# Patient Record
Sex: FEMALE | Race: WHITE | NOT HISPANIC OR LATINO | URBAN - METROPOLITAN AREA
[De-identification: names, ages, dates, MRNs, and addresses within clinical notes are randomized per-mention and may not be internally consistent; named-entity substitution may affect disease eponyms.]

---

## 2018-07-13 PROCEDURE — 88304 TISSUE EXAM BY PATHOLOGIST: CPT | Performed by: ORTHOPAEDIC SURGERY

## 2018-07-16 ENCOUNTER — LAB REQUISITION (OUTPATIENT)
Dept: LAB | Facility: HOSPITAL | Age: 48
End: 2018-07-16
Attending: ORTHOPAEDIC SURGERY
Payer: COMMERCIAL

## 2018-07-16 DIAGNOSIS — D21.12 BENIGN NEOPLASM OF CONNECTIVE AND OTHER SOFT TISSUE OF LEFT UPPER LIMB, INCLUDING SHOULDER: ICD-10-CM

## 2018-07-17 LAB
CASE RPRT: NORMAL
CLINICAL INFO: NORMAL
PATH REPORT.FINAL DX SPEC: NORMAL
PATH REPORT.GROSS SPEC: NORMAL

## 2021-07-08 ENCOUNTER — VBI (OUTPATIENT)
Dept: ADMINISTRATIVE | Facility: OTHER | Age: 51
End: 2021-07-08

## 2021-07-08 NOTE — TELEPHONE ENCOUNTER
Upon review of the In Basket request we were able to locate, review, and update the patient chart as requested for Immunization(s) Influenza, Mammogram and Pap Smear (HPV) aka Cervical Cancer Screening  Any additional questions or concerns should be emailed to the Practice Liaisons via [email or web address] email, please do not reply via In Basket      Thank you  Edgar Clarke

## 2021-07-31 PROBLEM — Z30.41 SURVEILLANCE FOR BIRTH CONTROL, ORAL CONTRACEPTIVES: Status: ACTIVE | Noted: 2021-07-31

## 2021-07-31 PROBLEM — R92.30 DENSE BREAST TISSUE ON MAMMOGRAM: Status: ACTIVE | Noted: 2021-07-31

## 2021-07-31 PROBLEM — R92.2 DENSE BREAST TISSUE ON MAMMOGRAM: Status: ACTIVE | Noted: 2021-07-31

## 2021-08-03 ENCOUNTER — ANNUAL EXAM (OUTPATIENT)
Dept: OBGYN CLINIC | Facility: CLINIC | Age: 51
End: 2021-08-03
Payer: COMMERCIAL

## 2021-08-03 VITALS
DIASTOLIC BLOOD PRESSURE: 58 MMHG | HEIGHT: 61 IN | BODY MASS INDEX: 21.34 KG/M2 | WEIGHT: 113 LBS | SYSTOLIC BLOOD PRESSURE: 90 MMHG

## 2021-08-03 DIAGNOSIS — Z12.31 ENCOUNTER FOR SCREENING MAMMOGRAM FOR MALIGNANT NEOPLASM OF BREAST: ICD-10-CM

## 2021-08-03 DIAGNOSIS — Z12.4 SCREENING FOR MALIGNANT NEOPLASM OF THE CERVIX: ICD-10-CM

## 2021-08-03 DIAGNOSIS — Z01.419 ENCOUNTER FOR GYNECOLOGICAL EXAMINATION (GENERAL) (ROUTINE) WITHOUT ABNORMAL FINDINGS: Primary | ICD-10-CM

## 2021-08-03 DIAGNOSIS — R92.2 DENSE BREAST TISSUE ON MAMMOGRAM: ICD-10-CM

## 2021-08-03 PROBLEM — Z30.41 SURVEILLANCE FOR BIRTH CONTROL, ORAL CONTRACEPTIVES: Status: RESOLVED | Noted: 2021-07-31 | Resolved: 2021-08-03

## 2021-08-03 PROCEDURE — 99396 PREV VISIT EST AGE 40-64: CPT | Performed by: OBSTETRICS & GYNECOLOGY

## 2021-08-03 RX ORDER — LORATADINE 10 MG/1
10 TABLET ORAL DAILY
COMMUNITY

## 2021-08-03 RX ORDER — ALPRAZOLAM 0.25 MG/1
TABLET ORAL AS NEEDED
COMMUNITY
Start: 2021-07-26

## 2021-08-03 NOTE — PROGRESS NOTES
Assessment/Plan:    Encounter for gynecological examination (general) (routine) without abnormal findings  Father passed 9/2020, no menses from 10/2020  Then restarted 3/2021 and monthly for 5 months, lasts 7 days, mod to heavy flow, no IMB  Off OCPs for one year  Cycle warnings given  Normal breast and pelvic exams, pap done  Mammo and bilateral u/s orders given for dense breasts  Diagnoses and all orders for this visit:    Encounter for gynecological examination (general) (routine) without abnormal findings    Encounter for screening mammogram for malignant neoplasm of breast  -     Mammo screening bilateral w 3d & cad; Future    Screening for malignant neoplasm of the cervix  -     IGP, Aptima HPV, Rfx 16/18,45    Dense breast tissue on mammogram  -     US breast screening bilateral complete (ABUS); Future    Other orders  -     ALPRAZolam (XANAX) 0 25 mg tablet; as needed   -     loratadine (CLARITIN) 10 mg tablet; Take 10 mg by mouth daily  -     Multiple Vitamins-Minerals (DAILY MULTI PO); Take 1 tablet by mouth daily  -     Probiotic Product (PROBIOTIC DAILY PO); Take 1 capsule by mouth daily  -     Calcium Polycarbophil (FIBER-CAPS PO); Take 2 capsules by mouth daily  -     Calcium Carb-Cholecalciferol (CALCIUM 1000 + D PO); Take 1 tablet by mouth 2 (two) times a day  -     Boswellia-Glucosamine-Vit D (OSTEO BI-FLEX ONE PER DAY PO); Take 1 tablet by mouth daily          Subjective:      Patient ID: Leonel Lindsay is a 46 y o  female  Here for well check  The following portions of the patient's history were reviewed and updated as appropriate: allergies, current medications, past family history, past medical history, past social history, past surgical history and problem list     Review of Systems  No breast, bladder, bowel changes   No new persistent pain, bloating, early satiety or pelvic pressure      Objective:      BP 90/58   Ht 5' 0 5" (1 537 m)   Wt 51 3 kg (113 lb)   LMP 07/27/2021 (Exact Date)   Breastfeeding No   BMI 21 71 kg/m²          Physical Exam  General appearance: no distress, pleasant  Neck: thyroid without nodules or thyromegaly, no palpable adenopathy  Lymph nodes: no palpable adenopathy  Breasts: no masses, nodes or skin changes  Abdomen: soft, non tender, no palpable masses  Pelvic exam: normal external genitalia, urethral meatus normal, vagina without lesions, cervix without lesions, uterus small, non tender, no adnexal masses, non tender  Rectal exam: no masses, non tender, RV confirms above

## 2021-08-03 NOTE — LETTER
August 3, 2021     Yassine Rios  [facility]  [address]    Patient: Dayana Moran   YOB: 1970   Date of Visit: 8/3/2021       Dear Dr Eilene Scheuermann: Thank you for referring Robby Ask to me for evaluation  Below are my notes for this consultation  If you have questions, please do not hesitate to call me  I look forward to following your patient along with you  Sincerely,        Cindi Davenport MD        CC: No Recipients  iCndi Davenport MD  8/3/2021  1:26 PM  Sign when Signing Visit  Assessment/Plan:    Encounter for gynecological examination (general) (routine) without abnormal findings  Father passed 9/2020, no menses from 10/2020  Then restarted 3/2021 and monthly for 5 months, lasts 7 days, mod to heavy flow, no IMB  Off OCPs for one year  Cycle warnings given  Normal breast and pelvic exams, pap done  Mammo and bilateral u/s orders given for dense breasts  Diagnoses and all orders for this visit:    Encounter for gynecological examination (general) (routine) without abnormal findings    Encounter for screening mammogram for malignant neoplasm of breast  -     Mammo screening bilateral w 3d & cad; Future    Screening for malignant neoplasm of the cervix  -     IGP, Aptima HPV, Rfx 16/18,45    Dense breast tissue on mammogram  -     US breast screening bilateral complete (ABUS); Future    Other orders  -     ALPRAZolam (XANAX) 0 25 mg tablet; as needed   -     loratadine (CLARITIN) 10 mg tablet; Take 10 mg by mouth daily  -     Multiple Vitamins-Minerals (DAILY MULTI PO); Take 1 tablet by mouth daily  -     Probiotic Product (PROBIOTIC DAILY PO); Take 1 capsule by mouth daily  -     Calcium Polycarbophil (FIBER-CAPS PO); Take 2 capsules by mouth daily  -     Calcium Carb-Cholecalciferol (CALCIUM 1000 + D PO); Take 1 tablet by mouth 2 (two) times a day  -     Boswellia-Glucosamine-Vit D (OSTEO BI-FLEX ONE PER DAY PO);  Take 1 tablet by mouth daily Subjective:      Patient ID: Joey Lombardi is a 46 y o  female  Here for well check  The following portions of the patient's history were reviewed and updated as appropriate: allergies, current medications, past family history, past medical history, past social history, past surgical history and problem list     Review of Systems  No breast, bladder, bowel changes   No new persistent pain, bloating, early satiety or pelvic pressure      Objective:      BP 90/58   Ht 5' 0 5" (1 537 m)   Wt 51 3 kg (113 lb)   LMP 07/27/2021 (Exact Date)   Breastfeeding No   BMI 21 71 kg/m²          Physical Exam  General appearance: no distress, pleasant  Neck: thyroid without nodules or thyromegaly, no palpable adenopathy  Lymph nodes: no palpable adenopathy  Breasts: no masses, nodes or skin changes  Abdomen: soft, non tender, no palpable masses  Pelvic exam: normal external genitalia, urethral meatus normal, vagina without lesions, cervix without lesions, uterus small, non tender, no adnexal masses, non tender  Rectal exam: no masses, non tender, RV confirms above

## 2021-08-03 NOTE — ASSESSMENT & PLAN NOTE
Father passed 9/2020, no menses from 10/2020  Then restarted 3/2021 and monthly for 5 months, lasts 7 days, mod to heavy flow, no IMB  Off OCPs for one year  Cycle warnings given  Normal breast and pelvic exams, pap done  Mammo and bilateral u/s orders given for dense breasts

## 2021-08-03 NOTE — PATIENT INSTRUCTIONS
Return to office in one year unless having any problems such as bleeding, new persistent pain, new progressive bloating, new problems eating (getting full to quickly) or new constant urinary pressure that does not resolve in one week  Continue to strive for total calcium intake of 1500 mg and vitamin D of 1000 IU in combined dietary and supplement forms  Avoid excess calcium as this may adversely effect the arteries in the heart  Call for menses occuring earlier than 21 days, lasting longer than 10 days or for bleeding between cycles

## 2021-08-06 LAB
CYTOLOGIST CVX/VAG CYTO: NORMAL
DX ICD CODE: NORMAL
HPV I/H RISK 4 DNA CVX QL PROBE+SIG AMP: NEGATIVE
OTHER STN SPEC: NORMAL
PATH REPORT.FINAL DX SPEC: NORMAL
SL AMB NOTE:: NORMAL
SL AMB SPECIMEN ADEQUACY: NORMAL
SL AMB TEST METHODOLOGY: NORMAL

## 2022-08-23 ENCOUNTER — ANNUAL EXAM (OUTPATIENT)
Dept: OBGYN CLINIC | Facility: CLINIC | Age: 52
End: 2022-08-23
Payer: COMMERCIAL

## 2022-08-23 VITALS
WEIGHT: 112 LBS | BODY MASS INDEX: 21.14 KG/M2 | HEIGHT: 61 IN | DIASTOLIC BLOOD PRESSURE: 80 MMHG | SYSTOLIC BLOOD PRESSURE: 94 MMHG

## 2022-08-23 DIAGNOSIS — R92.2 DENSE BREAST TISSUE ON MAMMOGRAM: ICD-10-CM

## 2022-08-23 DIAGNOSIS — Z01.419 ENCOUNTER FOR GYNECOLOGICAL EXAMINATION (GENERAL) (ROUTINE) WITHOUT ABNORMAL FINDINGS: Primary | ICD-10-CM

## 2022-08-23 DIAGNOSIS — Z78.0 ASYMPTOMATIC MENOPAUSAL STATE: ICD-10-CM

## 2022-08-23 DIAGNOSIS — Z12.31 ENCOUNTER FOR SCREENING MAMMOGRAM FOR MALIGNANT NEOPLASM OF BREAST: ICD-10-CM

## 2022-08-23 DIAGNOSIS — Z13.820 OSTEOPOROSIS SCREENING: ICD-10-CM

## 2022-08-23 PROCEDURE — 0503F POSTPARTUM CARE VISIT: CPT | Performed by: OBSTETRICS & GYNECOLOGY

## 2022-08-23 PROCEDURE — 99396 PREV VISIT EST AGE 40-64: CPT | Performed by: OBSTETRICS & GYNECOLOGY

## 2022-08-23 RX ORDER — ESCITALOPRAM OXALATE 10 MG/1
TABLET ORAL
COMMUNITY
Start: 2022-08-03

## 2022-08-23 RX ORDER — MAGNESIUM 30 MG
30 TABLET ORAL 2 TIMES DAILY
COMMUNITY

## 2022-08-23 NOTE — ASSESSMENT & PLAN NOTE
All well, no complaints  Normal breast and pelvic exams  LMP 7/2021, tolerable hot flashes and night sweats  Last pap 8/2021 neg/neg; due by 2026  Mammo and bilateral breast u/s orders given; last 8/16/2021   Insurance has declined breast u/s in past, will check  Dexa orders given for BMI 21, menopausal, will check with insurance for coverage prior to 65  Colonoscopy 2018, due 2028

## 2022-08-23 NOTE — LETTER
2022     Jerson Olson DO  [facility]  [address]    Patient: Thiago Nuñez   YOB: 1970   Date of Visit: 2022       Dear Dr Janie Lewis: Thank you for referring Agustina Mccarty to me for evaluation  Below are my notes for this consultation  If you have questions, please do not hesitate to call me  I look forward to following your patient along with you  Sincerely,        Gavin Caraballo MD        CC: No Recipients  Gavin Caraballo MD  2022  3:02 PM  Sign when Signing Visit  Assessment/Plan:    Encounter for gynecological examination (general) (routine) without abnormal findings  All well, no complaints  Normal breast and pelvic exams  LMP 2021, tolerable hot flashes and night sweats  Last pap 2021 neg/neg; due by   Mammo and bilateral breast u/s orders given; last 2021  Insurance has declined breast u/s in past, will check  Dexa orders given for BMI 21, menopausal, will check with insurance for coverage prior to 65  Colonoscopy , due        Diagnoses and all orders for this visit:    Encounter for screening mammogram for malignant neoplasm of breast  -     Mammo screening bilateral w 3d & cad; Future    Dense breast tissue on mammogram  -     US breast screening bilateral complete (ABUS); Future    Osteoporosis screening  -     DXA bone density spine hip and pelvis; Future    Asymptomatic menopausal state  -     DXA bone density spine hip and pelvis; Future    Encounter for gynecological examination (general) (routine) without abnormal findings    Other orders  -     magnesium 30 MG tablet; Take 30 mg by mouth 2 (two) times a day  -     escitalopram (LEXAPRO) 10 mg tablet          Subjective:      Patient ID: Thiago Nuñez is a 45 y o  female  Here for well check        The following portions of the patient's history were reviewed and updated as appropriate:   She  has a past medical history of  2 para 2, History of pelvic ultrasound (02/13/2020), and Papanicolaou smear (04/28/2017)  She   Patient Active Problem List    Diagnosis Date Noted    Encounter for gynecological examination (general) (routine) without abnormal findings 08/03/2021    Dense breast tissue on mammogram 07/31/2021     She  has a past surgical history that includes [place] tooth extraction; Mammo (historical) (Bilateral, 06/17/2020); and Colonoscopy (11/2018)  Her family history is not on file  She  reports that she has never smoked  She has never used smokeless tobacco  She reports that she does not use drugs  No history on file for alcohol use  Current Outpatient Medications   Medication Sig Dispense Refill    ALPRAZolam (XANAX) 0 25 mg tablet as needed       Calcium Carb-Cholecalciferol (CALCIUM 1000 + D PO) Take 1 tablet by mouth 2 (two) times a day      escitalopram (LEXAPRO) 10 mg tablet       loratadine (CLARITIN) 10 mg tablet Take 10 mg by mouth daily      magnesium 30 MG tablet Take 30 mg by mouth 2 (two) times a day      Multiple Vitamins-Minerals (DAILY MULTI PO) Take 1 tablet by mouth daily      Boswellia-Glucosamine-Vit D (OSTEO BI-FLEX ONE PER DAY PO) Take 1 tablet by mouth daily      Calcium Polycarbophil (FIBER-CAPS PO) Take 2 capsules by mouth daily      Probiotic Product (PROBIOTIC DAILY PO) Take 1 capsule by mouth daily       No current facility-administered medications for this visit  She has No Known Allergies       Review of Systems  No PMB, breast, bladder, bowel changes   No new persistent pain, bloating, early satiety or pelvic pressure      Objective:      BP 94/80   Ht 5' 1" (1 549 m)   Wt 50 8 kg (112 lb)   Breastfeeding No   BMI 21 16 kg/m²          Physical Exam    General appearance: no distress, pleasant  Neck: thyroid without nodules or thyromegaly, no palpable adenopathy  Lymph nodes: no palpable adenopathy  Breasts: no masses, nodes or skin changes  Abdomen: soft, non tender, no palpable masses  Pelvic exam: normal external genitalia, urethral meatus normal, vagina without lesions, cervix without lesions, uterus small, non tender, no adnexal masses, non tender  Rectal exam: no masses, non tender, RV confirms above

## 2022-08-23 NOTE — PATIENT INSTRUCTIONS
Return to office in one year unless having any problems such as breast changes, bleeding, new persistent pain, new progressive bloating, new problems eating (getting full to quickly) or new constant urinary pressure that does not resolve in one week  Call in six months to schedule your annual visit       Try Black Cohash Cassidyashwini Rueda) for hot flashes, night sweats

## 2022-09-20 DIAGNOSIS — Z12.31 ENCOUNTER FOR SCREENING MAMMOGRAM FOR MALIGNANT NEOPLASM OF BREAST: ICD-10-CM

## 2022-09-21 DIAGNOSIS — Z13.820 OSTEOPOROSIS SCREENING: ICD-10-CM

## 2022-09-21 DIAGNOSIS — Z78.0 ASYMPTOMATIC MENOPAUSAL STATE: ICD-10-CM

## 2022-09-22 ENCOUNTER — TELEPHONE (OUTPATIENT)
Dept: OBGYN CLINIC | Facility: CLINIC | Age: 52
End: 2022-09-22

## 2022-09-22 NOTE — TELEPHONE ENCOUNTER
Please inform of dexa with normal hip and spine and mild osteopenia in femoral neck with low risk of fracture  (Spine, hip normal  Fem neck T-1 9, low risk of fracture)    Continue to strive for total calcium intake of 1200 mg and vitamin D of 1000 IU in combined dietary and supplement forms  You can only absorb 600 mg of calcium at a time  Avoid excess calcium as this may adversely effect the arteries in the heart  I would recommend repeating the test in 5 year  Please encourage to sign up for MyChart  Thanks!

## 2022-09-22 NOTE — TELEPHONE ENCOUNTER
Reviewed Dexa results and provided recommendation to strive for total calcium intake of 1200 mg and vitamin D of 1000 IU in combined dietary and supplement forms  Avoid excess calcium as this may adversely effect the arteries in the heart,body can only absorb 600mg of calcium at a time

## 2022-12-28 NOTE — PROGRESS NOTES
Assessment/Plan:    Encounter for gynecological examination (general) (routine) without abnormal findings  All well, no complaints  Normal breast and pelvic exams  LMP 2021, tolerable hot flashes and night sweats  Last pap 2021 neg/neg; due by   Mammo and bilateral breast u/s orders given; last 2021  Insurance has declined breast u/s in past, will check  Dexa orders given for BMI 21, menopausal, will check with insurance for coverage prior to 65  Colonoscopy , due        Diagnoses and all orders for this visit:    Encounter for screening mammogram for malignant neoplasm of breast  -     Mammo screening bilateral w 3d & cad; Future    Dense breast tissue on mammogram  -     US breast screening bilateral complete (ABUS); Future    Osteoporosis screening  -     DXA bone density spine hip and pelvis; Future    Asymptomatic menopausal state  -     DXA bone density spine hip and pelvis; Future    Encounter for gynecological examination (general) (routine) without abnormal findings    Other orders  -     magnesium 30 MG tablet; Take 30 mg by mouth 2 (two) times a day  -     escitalopram (LEXAPRO) 10 mg tablet          Subjective:      Patient ID: Cassidy Bach is a 45 y o  female  Here for well check  The following portions of the patient's history were reviewed and updated as appropriate:   She  has a past medical history of  2 para 2, History of pelvic ultrasound (2020), and Papanicolaou smear (2017)  She   Patient Active Problem List    Diagnosis Date Noted    Encounter for gynecological examination (general) (routine) without abnormal findings 2021    Dense breast tissue on mammogram 2021     She  has a past surgical history that includes [place] tooth extraction; Mammo (historical) (Bilateral, 2020); and Colonoscopy (2018)  Her family history is not on file  She  reports that she has never smoked   She has never used smokeless tobacco  She Detail Level: Detailed reports that she does not use drugs  No history on file for alcohol use  Current Outpatient Medications   Medication Sig Dispense Refill    ALPRAZolam (XANAX) 0 25 mg tablet as needed       Calcium Carb-Cholecalciferol (CALCIUM 1000 + D PO) Take 1 tablet by mouth 2 (two) times a day      escitalopram (LEXAPRO) 10 mg tablet       loratadine (CLARITIN) 10 mg tablet Take 10 mg by mouth daily      magnesium 30 MG tablet Take 30 mg by mouth 2 (two) times a day      Multiple Vitamins-Minerals (DAILY MULTI PO) Take 1 tablet by mouth daily      Boswellia-Glucosamine-Vit D (OSTEO BI-FLEX ONE PER DAY PO) Take 1 tablet by mouth daily      Calcium Polycarbophil (FIBER-CAPS PO) Take 2 capsules by mouth daily      Probiotic Product (PROBIOTIC DAILY PO) Take 1 capsule by mouth daily       No current facility-administered medications for this visit  She has No Known Allergies       Review of Systems  No PMB, breast, bladder, bowel changes   No new persistent pain, bloating, early satiety or pelvic pressure      Objective:      BP 94/80   Ht 5' 1" (1 549 m)   Wt 50 8 kg (112 lb)   Breastfeeding No   BMI 21 16 kg/m²          Physical Exam    General appearance: no distress, pleasant  Neck: thyroid without nodules or thyromegaly, no palpable adenopathy  Lymph nodes: no palpable adenopathy  Breasts: no masses, nodes or skin changes  Abdomen: soft, non tender, no palpable masses  Pelvic exam: normal external genitalia, urethral meatus normal, vagina without lesions, cervix without lesions, uterus small, non tender, no adnexal masses, non tender  Rectal exam: no masses, non tender, RV confirms above

## 2023-08-30 ENCOUNTER — APPOINTMENT (OUTPATIENT)
Dept: PREADMISSION TESTING | Facility: HOSPITAL | Age: 53
End: 2023-08-30
Payer: COMMERCIAL

## 2023-08-30 VITALS — BODY MASS INDEX: 20.61 KG/M2 | WEIGHT: 112 LBS | HEIGHT: 62 IN

## 2023-08-30 RX ORDER — ESCITALOPRAM OXALATE 10 MG/1
15 TABLET ORAL DAILY
COMMUNITY

## 2023-08-30 ASSESSMENT — PAIN SCALES - GENERAL: PAINLEVEL: 5

## 2023-08-30 NOTE — PRE-PROCEDURE INSTRUCTIONS
1. Admissions will call you with your arrival time on  23 (day prior to surgery) between 2pm-4pm.  For questions about your arrival time, please call [phone].     2. On the day of your procedure please report to the [facility] in the [place]. Please arrive through the [facility] Entrance([address], [place])  If you are parking in the [facility]age, come to the ground floor of the garage and follow signs to the [facility].  Please report to the Surgery Registration Desk located in the [facility]. Bring insurance card and photo ID     3. Please follow the following fasting guidelines: No solid food EIGHT HOURS prior to arrival time. Unlimited clear liquids, meaning water or PLAIN black coffee WITHOUT any milk, cream, sugar, or sweetener are permitted up to TWO HOURS prior to arrival at the hospital.    4. Please take ONLY the following medications with a sip of water on the morning of your procedure: lexapro    Please stop takin. Other Instructions: You may brush your teeth the morning of the procedure. Rinse and spit, do not swallow.  Bring a list of your medications with dosages.  Use surgical wash as directed.     6. If you develop a cold, cough, fever, rash, or other symptom prior to the day of the procedure, please report it to your physician immediately.    7. If you need to cancel the procedure for any reason, please contact your surgeon.    8. Make arrangements to have safe transportation home accompanied by a responsible adult. If you have not arranged safe transportation home, your surgery will be cancelled. Safe transportation may include private vehicle, ride-share service, taxi and public transportation when accompanied by a responsible adult who will assist you home. A responsible adult is someone known to you and does not include the taxi, ride-share or public transit drive transporting you.    9.  If it is medically necessary for  you to have a longer stay, you will be informed as soon as the decision is made.    10. Only bring essential items to the hospital.  Do not wear or bring anything of value to the hospital including jewelry of any kind, money, or wallet. Do not wear make-up or contact lenses.  DO NOT BRING MEDICATIONS FROM HOME unless instructed to do so. DO bring your hearing aids, glasses, and a case    11. No lotion, creams, powders, or oils on skin the morning of procedure     12. Dress in comfortable clothes.    13.  If instructed, please bring a copy of your Advanced Directive (Living Will/Durable Power of ) on the day of your procedure.     14. Ensuring your safety at all times is a very important part of our [facility] Culture of Safety. After having surgery and sedation, you are at risk for falling and balance issues. Although you may feel awake, the effects of the medication can last up to 24 hours after anesthesia. If you need to use the bathroom during your recovery period, nursing staff will escort you there and stay with you to ensure your safety.    15. Refrain from drinking alcohol and smoking cigarettes for 24 hours prior to surgery.    16. Shower with antibacterial soap (Dial) the night before and morning of your procedure.  If your procedure indicates the need for CHG antiseptic wash (Bactoshield or Hibiclens), please use this instead and follow instructions as discussed at the time of your Pre-Admission Testing phone interview or visit.    Above instructions reviewed with patient and patient acknowledges understanding.      How to Use an Incentive Spirometer  An incentive spirometer is a tool that measures how well you are filling your lungs with each breath. Learning to take long, deep breaths using this tool can help you keep your lungs clear and active. This may help to reverse or lessen your chance of developing breathing (pulmonary) problems, especially infection. You may be asked to use a  spirometer:   After a surgery.   If you have a lung problem or a history of smoking.   After a long period of time when you have been unable to move or be active.  If the spirometer includes an indicator to show the highest number that you have reached, your health care provider or respiratory therapist will help youset a goal. Keep a log of your progress as told by your health care provider.  What are the risks?   Breathing too quickly may cause dizziness or cause you to pass out. Take your time so you do not get dizzy or light-headed.   If you are in pain, you may need to take pain medicine before doing incentive spirometry. It is harder to take a deep breath if you are having pain.  How to use your incentive spirometer    1. Sit up on the edge of your bed or on a chair.  2. Hold the incentive spirometer so that it is in an upright position.  3. Before you use the spirometer, breathe out normally.  4. Place the mouthpiece in your mouth. Make sure your lips are closed tightly around it.  5. Breathe in slowly and as deeply as you can through your mouth, causing the piston or the ball to rise toward the top of the chamber.  6. Hold your breath for 3-5 seconds, or for as long as possible.  ? If the spirometer includes a  indicator, use this to guide you in breathing. Slow down your breathing if the indicator goes above the marked areas.  7. Remove the mouthpiece from your mouth and breathe out normally. The piston or ball will return to the bottom of the chamber.  8. Rest for a few seconds, then repeat the steps 10 or more times.  ? Take your time and take a few normal breaths between deep breaths so that you do not get dizzy or light-headed.  ? Do this every 1-2 hours when you are awake.  9. If the spirometer includes a goal marker to show the highest number you have reached (best effort), use this as a goal to work toward during each repetition.  10. After each set of 10 deep breaths, cough a few times.  This will help to make sure that your lungs are clear.  ? If you have an incision on your chest or abdomen from surgery, place a pillow or a rolled-up towel firmly against the incision when you cough. This can help to reduce pain while taking deep breaths and coughing.  General tips  1. When you are able to get out of bed:  ? Walk around often.  ? Continue to take deep breaths and cough in order to clear your lungs.  2. Keep using the incentive spirometer until your health care provider says it is okay to stop using it. If you have been in the hospital, you may be told to keep using the spirometer at home.  Contact a health care provider if:   You are having difficulty using the spirometer.   You have trouble using the spirometer as often as instructed.   Your pain medicine is not giving enough relief for you to use the spirometer as told.   You have a fever.  Get help right away if:   You develop shortness of breath.   You develop a cough with bloody mucus from the lungs.   You have fluid or blood coming from an incision site after you cough.  Summary   An incentive spirometer is a tool that can help you learn to take long, deep breaths to keep your lungs clear and active.   You may be asked to use a spirometer after a surgery, if you have a lung problem or a history of smoking, or if you have been inactive for a long period of time.   Use your incentive spirometer as instructed every 1-2 hours while you are awake.   If you have an incision on your chest or abdomen, place a pillow or a rolled-up towel firmly against your incision when you cough. This will help to reduce pain.   Get help right away if you have shortness of breath, you cough up bloody mucus, or blood comes from your incision when you cough.  This information is not intended to replace advice given to you by your health care provider. Make sure you discuss any questions you have with your healthcare provider.  Document Revised: 03/08/2021  Document Reviewed: 03/08/2021  [employer] Patient Education © 2022 [employer]        Above instructions reviewed with patient and patient acknowledges understanding.    Form explained by: Teressa berumen rn    covid visitor policy reviewed

## 2023-09-12 ENCOUNTER — APPOINTMENT (OUTPATIENT)
Dept: RADIOLOGY | Facility: HOSPITAL | Age: 53
Setting detail: HOSPITAL OUTPATIENT SURGERY
End: 2023-09-12
Payer: COMMERCIAL

## 2023-09-12 ENCOUNTER — HOSPITAL ENCOUNTER (OUTPATIENT)
Facility: HOSPITAL | Age: 53
Setting detail: HOSPITAL OUTPATIENT SURGERY
Discharge: HOME | End: 2023-09-12
Attending: PODIATRIST | Admitting: PODIATRIST
Payer: COMMERCIAL

## 2023-09-12 ENCOUNTER — ANESTHESIA EVENT (OUTPATIENT)
Dept: OPERATING ROOM | Facility: HOSPITAL | Age: 53
Setting detail: HOSPITAL OUTPATIENT SURGERY
End: 2023-09-12
Payer: COMMERCIAL

## 2023-09-12 VITALS
OXYGEN SATURATION: 100 % | SYSTOLIC BLOOD PRESSURE: 127 MMHG | HEART RATE: 57 BPM | RESPIRATION RATE: 20 BRPM | HEIGHT: 62 IN | DIASTOLIC BLOOD PRESSURE: 73 MMHG | BODY MASS INDEX: 20.61 KG/M2 | WEIGHT: 112 LBS | TEMPERATURE: 97 F

## 2023-09-12 PROCEDURE — 71000002 HC PACU PHASE 2 INITIAL 30MIN: Performed by: PODIATRIST

## 2023-09-12 PROCEDURE — 73630 X-RAY EXAM OF FOOT: CPT | Mod: LT

## 2023-09-12 PROCEDURE — 71000011 HC PACU PHASE 1 EA ADDL MIN: Performed by: PODIATRIST

## 2023-09-12 PROCEDURE — 63600000 HC DRUGS/DETAIL CODE: Mod: JZ

## 2023-09-12 PROCEDURE — 25000000 HC PHARMACY GENERAL: Performed by: PODIATRIST

## 2023-09-12 PROCEDURE — 71000001 HC PACU PHASE 1 INITIAL 30MIN: Performed by: PODIATRIST

## 2023-09-12 PROCEDURE — C1713 ANCHOR/SCREW BN/BN,TIS/BN: HCPCS | Performed by: PODIATRIST

## 2023-09-12 PROCEDURE — C1776 JOINT DEVICE (IMPLANTABLE): HCPCS | Performed by: PODIATRIST

## 2023-09-12 PROCEDURE — 63600000 HC DRUGS/DETAIL CODE: Performed by: STUDENT IN AN ORGANIZED HEALTH CARE EDUCATION/TRAINING PROGRAM

## 2023-09-12 PROCEDURE — 37000001 HC ANESTHESIA GENERAL: Performed by: PODIATRIST

## 2023-09-12 PROCEDURE — 27200000 HC STERILE SUPPLY: Performed by: PODIATRIST

## 2023-09-12 PROCEDURE — 0QBR0ZZ EXCISION OF LEFT TOE PHALANX, OPEN APPROACH: ICD-10-PCS | Performed by: PODIATRIST

## 2023-09-12 PROCEDURE — 36000003 HC OR LEVEL 3 INITIAL 30MIN: Performed by: PODIATRIST

## 2023-09-12 PROCEDURE — 25000000 HC PHARMACY GENERAL: Performed by: STUDENT IN AN ORGANIZED HEALTH CARE EDUCATION/TRAINING PROGRAM

## 2023-09-12 PROCEDURE — 0SRN0JZ REPLACEMENT OF LEFT METATARSAL-PHALANGEAL JOINT WITH SYNTHETIC SUBSTITUTE, OPEN APPROACH: ICD-10-PCS | Performed by: PODIATRIST

## 2023-09-12 PROCEDURE — 25800000 HC PHARMACY IV SOLUTIONS: Performed by: NURSE ANESTHETIST, CERTIFIED REGISTERED

## 2023-09-12 PROCEDURE — 63600000 HC DRUGS/DETAIL CODE: Mod: JZ | Performed by: NURSE ANESTHETIST, CERTIFIED REGISTERED

## 2023-09-12 PROCEDURE — 71000012 HC PACU PHASE 2 EA ADDL MIN: Performed by: PODIATRIST

## 2023-09-12 PROCEDURE — 63600000 HC DRUGS/DETAIL CODE: Performed by: NURSE ANESTHETIST, CERTIFIED REGISTERED

## 2023-09-12 PROCEDURE — 63600000 HC DRUGS/DETAIL CODE: Performed by: PODIATRIST

## 2023-09-12 PROCEDURE — 36000013 HC OR LEVEL 3 EA ADDL MIN: Performed by: PODIATRIST

## 2023-09-12 DEVICE — IMPLANTABLE DEVICE: Type: IMPLANTABLE DEVICE | Site: FIRST TOE | Status: FUNCTIONAL

## 2023-09-12 RX ORDER — ONDANSETRON HYDROCHLORIDE 2 MG/ML
INJECTION, SOLUTION INTRAVENOUS AS NEEDED
Status: DISCONTINUED | OUTPATIENT
Start: 2023-09-12 | End: 2023-09-12 | Stop reason: SURG

## 2023-09-12 RX ORDER — ONDANSETRON HYDROCHLORIDE 2 MG/ML
4 INJECTION, SOLUTION INTRAVENOUS
Status: DISCONTINUED | OUTPATIENT
Start: 2023-09-12 | End: 2023-09-12 | Stop reason: HOSPADM

## 2023-09-12 RX ORDER — LIDOCAINE HYDROCHLORIDE 10 MG/ML
INJECTION, SOLUTION INFILTRATION; PERINEURAL
Status: DISCONTINUED | OUTPATIENT
Start: 2023-09-12 | End: 2023-09-12 | Stop reason: HOSPADM

## 2023-09-12 RX ORDER — DEXAMETHASONE SODIUM PHOSPHATE 4 MG/ML
INJECTION, SOLUTION INTRA-ARTICULAR; INTRALESIONAL; INTRAMUSCULAR; INTRAVENOUS; SOFT TISSUE AS NEEDED
Status: DISCONTINUED | OUTPATIENT
Start: 2023-09-12 | End: 2023-09-12 | Stop reason: SURG

## 2023-09-12 RX ORDER — FENTANYL CITRATE 50 UG/ML
50 INJECTION, SOLUTION INTRAMUSCULAR; INTRAVENOUS EVERY 5 MIN PRN
Status: DISCONTINUED | OUTPATIENT
Start: 2023-09-12 | End: 2023-09-12 | Stop reason: HOSPADM

## 2023-09-12 RX ORDER — BETAMETHASONE SODIUM PHOSPHATE AND BETAMETHASONE ACETATE 3; 3 MG/ML; MG/ML
INJECTION, SUSPENSION INTRA-ARTICULAR; INTRALESIONAL; INTRAMUSCULAR; SOFT TISSUE
Status: DISCONTINUED | OUTPATIENT
Start: 2023-09-12 | End: 2023-09-12 | Stop reason: HOSPADM

## 2023-09-12 RX ORDER — IBUPROFEN 200 MG
16-32 TABLET ORAL AS NEEDED
Status: DISCONTINUED | OUTPATIENT
Start: 2023-09-12 | End: 2023-09-12 | Stop reason: HOSPADM

## 2023-09-12 RX ORDER — LABETALOL HCL 20 MG/4 ML
5 SYRINGE (ML) INTRAVENOUS AS NEEDED
Status: DISCONTINUED | OUTPATIENT
Start: 2023-09-12 | End: 2023-09-12 | Stop reason: HOSPADM

## 2023-09-12 RX ORDER — HYDROMORPHONE HYDROCHLORIDE 1 MG/ML
0.5 INJECTION, SOLUTION INTRAMUSCULAR; INTRAVENOUS; SUBCUTANEOUS
Status: DISCONTINUED | OUTPATIENT
Start: 2023-09-12 | End: 2023-09-12 | Stop reason: HOSPADM

## 2023-09-12 RX ORDER — SODIUM CHLORIDE 9 MG/ML
INJECTION, SOLUTION INTRAVENOUS CONTINUOUS PRN
Status: DISCONTINUED | OUTPATIENT
Start: 2023-09-12 | End: 2023-09-12 | Stop reason: SURG

## 2023-09-12 RX ORDER — OXYCODONE AND ACETAMINOPHEN 5; 325 MG/1; MG/1
1 TABLET ORAL EVERY 6 HOURS PRN
Qty: 20 TABLET | Refills: 0 | Status: SHIPPED | OUTPATIENT
Start: 2023-09-12 | End: 2023-09-17

## 2023-09-12 RX ORDER — BUPIVACAINE HYDROCHLORIDE 5 MG/ML
INJECTION, SOLUTION EPIDURAL; INTRACAUDAL
Status: DISCONTINUED | OUTPATIENT
Start: 2023-09-12 | End: 2023-09-12 | Stop reason: HOSPADM

## 2023-09-12 RX ORDER — PROPOFOL 10 MG/ML
INJECTION, EMULSION INTRAVENOUS CONTINUOUS PRN
Status: DISCONTINUED | OUTPATIENT
Start: 2023-09-12 | End: 2023-09-12 | Stop reason: SURG

## 2023-09-12 RX ORDER — DEXTROSE 40 %
15-30 GEL (GRAM) ORAL AS NEEDED
Status: DISCONTINUED | OUTPATIENT
Start: 2023-09-12 | End: 2023-09-12 | Stop reason: HOSPADM

## 2023-09-12 RX ORDER — EPHEDRINE SULFATE 50 MG/ML
INJECTION, SOLUTION INTRAVENOUS AS NEEDED
Status: DISCONTINUED | OUTPATIENT
Start: 2023-09-12 | End: 2023-09-12 | Stop reason: SURG

## 2023-09-12 RX ORDER — KETOROLAC TROMETHAMINE 15 MG/ML
INJECTION, SOLUTION INTRAMUSCULAR; INTRAVENOUS AS NEEDED
Status: DISCONTINUED | OUTPATIENT
Start: 2023-09-12 | End: 2023-09-12 | Stop reason: SURG

## 2023-09-12 RX ORDER — MIDAZOLAM HYDROCHLORIDE 2 MG/2ML
INJECTION, SOLUTION INTRAMUSCULAR; INTRAVENOUS AS NEEDED
Status: DISCONTINUED | OUTPATIENT
Start: 2023-09-12 | End: 2023-09-12 | Stop reason: SURG

## 2023-09-12 RX ORDER — DEXTROSE 50 % IN WATER (D50W) INTRAVENOUS SYRINGE
25 AS NEEDED
Status: DISCONTINUED | OUTPATIENT
Start: 2023-09-12 | End: 2023-09-12 | Stop reason: HOSPADM

## 2023-09-12 RX ORDER — CEPHALEXIN 500 MG/1
500 CAPSULE ORAL 3 TIMES DAILY
Qty: 15 CAPSULE | Refills: 0 | Status: SHIPPED | OUTPATIENT
Start: 2023-09-12 | End: 2023-09-17

## 2023-09-12 RX ORDER — FENTANYL CITRATE 50 UG/ML
INJECTION, SOLUTION INTRAMUSCULAR; INTRAVENOUS AS NEEDED
Status: DISCONTINUED | OUTPATIENT
Start: 2023-09-12 | End: 2023-09-12 | Stop reason: SURG

## 2023-09-12 RX ADMIN — KETOROLAC TROMETHAMINE 15 MG: 15 INJECTION, SOLUTION INTRAMUSCULAR; INTRAVENOUS at 16:45

## 2023-09-12 RX ADMIN — EPHEDRINE SULFATE 5 MG: 50 INJECTION, SOLUTION INTRAVENOUS at 16:13

## 2023-09-12 RX ADMIN — SODIUM CHLORIDE: 0.9 INJECTION, SOLUTION INTRAVENOUS at 14:58

## 2023-09-12 RX ADMIN — FENTANYL CITRATE 50 MCG: 50 INJECTION INTRAMUSCULAR; INTRAVENOUS at 15:05

## 2023-09-12 RX ADMIN — EPHEDRINE SULFATE 5 MG: 50 INJECTION, SOLUTION INTRAVENOUS at 15:32

## 2023-09-12 RX ADMIN — CEFAZOLIN 2 G: 2 INJECTION, POWDER, FOR SOLUTION INTRAMUSCULAR; INTRAVENOUS at 15:01

## 2023-09-12 RX ADMIN — ONDANSETRON 4 MG: 2 INJECTION INTRAMUSCULAR; INTRAVENOUS at 15:09

## 2023-09-12 RX ADMIN — DEXAMETHASONE SODIUM PHOSPHATE 4 MG: 4 INJECTION, SOLUTION INTRA-ARTICULAR; INTRALESIONAL; INTRAMUSCULAR; INTRAVENOUS; SOFT TISSUE at 15:10

## 2023-09-12 RX ADMIN — EPHEDRINE SULFATE 5 MG: 50 INJECTION, SOLUTION INTRAVENOUS at 16:01

## 2023-09-12 RX ADMIN — MIDAZOLAM HYDROCHLORIDE 2 MG: 1 INJECTION, SOLUTION INTRAMUSCULAR; INTRAVENOUS at 14:58

## 2023-09-12 RX ADMIN — EPHEDRINE SULFATE 5 MG: 50 INJECTION, SOLUTION INTRAVENOUS at 16:26

## 2023-09-12 RX ADMIN — PROPOFOL 100 MCG/KG/MIN: 10 INJECTION, EMULSION INTRAVENOUS at 15:06

## 2023-09-12 NOTE — PERIOPERATIVE NURSING NOTE
VSS, maintains POX on RA. Dressing to L foot CDI. Denies pain. Tolerating crackers and clears. Given d/c instructions- pt and spouse verbalizes understanding, all questions answered. Ambulating with walker without difficulty. Pt maintaining non weight bearing status to LLE. Ambulated to BR, able to void. D/C criteria met, IV removed. Transported to vehicle via wheelchair with PCT. Pt's spouse to transport home.

## 2023-09-12 NOTE — DISCHARGE SUMMARY
-Patient presented to hospital for outpatient podiatric procedure. All pre operative testing and clearance was obtained. All risks and benefits of procedure explained to patient and consent signed.  -After successful podiatric procedure the patient was discharge to home per PACU protocol with discharge instructions and post op pain medication.  -Patient is to follow up in 7-14 days      Sathish Noriega DPM  Pager [phone]

## 2023-09-12 NOTE — ANESTHESIA PREPROCEDURE EVALUATION
Relevant Problems   No relevant active problems       Anesthesia ROS/MED HX    Anesthesia History    Previous anesthetics       Past Surgical History:   Procedure Laterality Date    COLONOSCOPY      EYE SURGERY      LASIK      WISDOM TOOTH EXTRACTION         Physical Exam    Airway   Mallampati: II   TM distance: >3 FB   Neck ROM: full  Cardiovascular - normal   Rhythm: regular   Rate: normalPulmonary - normal   clear to auscultation  Dental - normal        Anesthesia Plan    Plan: general    Technique: total IV anesthesia     Lines and Monitors: PIV     Airway: natural airway / supplemental oxygen     instructed to abstain from smoking on day of procedure    Patient did not smoke on day of surgery   1 ASA  Blood Products:   Use of Blood Products Discussed: No   Anesthetic plan and risks discussed with: patient  Induction:    intravenous   Postop Plan:   Patient Disposition: phase II then home   Pain Management: IV analgesics

## 2023-09-12 NOTE — ANESTHESIOLOGIST PRE-PROCEDURE ATTESTATION
Pre-Procedure Patient Identification:  I am the Primary Anesthesiologist and have identified the patient on 09/12/23 at 11:18 AM.   I have confirmed the procedure(s) will be performed by the following surgeon/proceduralist Pepe Dow DPM.

## 2023-09-12 NOTE — DISCHARGE INSTRUCTIONS
Post-Operative Discharge Instructions   [facility]   Activity:   o No strenuous exercise or heavy lifting (over 10 pounds) until your follow up with doctor   o No driving until following up with doctor   o You may bear weight to your left heel for pivots and transfers only. Wear a surgical shoe on the left foot. You should keep weight off of your foot as much as possible to help with healing and avoid pressure to the area. Use a walker, crutches, or wheelchair for assistance.   o Keep your affected foot elevated on two pillows while in bed and sitting in a chair to decrease swelling   Nutrition:   o Eating more protein will help with wound healing, If possible, add protein to your diet to help increase healing factors.   Wound Care Instructions:   o No soaking your foot until it is completely healed.   o It is important that you keep your dressing clean, dry, and intact. Should your dressing become wet you must call your physician to make an immediate appointment to have the dressing changed. If you are taking a shower, you must use a commercial cast protector. We recommend, certainly, for the initial two week period that you take a sponge bath rather than a shower with its risk of a fall.   Medications:   o Resume all home medications unless otherwise directed by doctor or nurse practitioner   -Percocet was prescribed for your postop pain: Please take 1 to 2 tablets every 4-6 hours as needed for pain  -Keflex was prescribed for postoperative antibiotic: Please take 1 tablet 3 times a day for 5 days  Reasons to Call:   o Severe and persistent shortness of breath not relieved with rest   o Fever above 101.5 oF   o Redness, swelling or drainage from incision   Follow Up Appointment:   o Call to schedule an appointment with your physician/surgeon after discharge   Dr. Sajan Vanessa, Dr. Pepe Dow, and Dr. Medhat Hutchins  [phone]   [address], [address], [place] [address]

## 2023-09-12 NOTE — OR SURGEON
ePre-Procedure patient identification:  I am the primary operating surgeon/proceduralist and I have reviewed the applicable pathology reports and radiology studies for this procedure. I have identified the patient and confirmed laterality is left on 09/12/23 at 2:15 PM Pepe Dow DPM  Phone Number: [phone]

## 2023-09-13 NOTE — OP NOTE
Blank Op Note    9/12/2023  Hospital: [facility]  Medical Record Number:  [id number]  Patient Name:  Sia Aguilar  YOB: 1970   Date of Operation:  9/12/2023  Primary Surgeon:  Pepe Dow DPM  First Assistant: Pepe Noriega DPM    Preoperative Diagnosis: Hallux abductovalgus deformity with hallux limitus of the left lower extremity    Postoperative Diagnosis: Hallux abductovalgus deformity with hallux limitus of the left lower extremity    Procedure:Procedure(s):  Bunion With Implant    Anesthesia: Choice , 25 cc o1:1 mix of 1% lidocaine plain and 0.5% marcaine plain      Hemostasis: Pneumatic Ankle Tourniquet for 118 minutes, anatomical dissection    Operative Findings: Within the joint capsule there appeared to be a very thick and viscous synovial fluid almost comparable to a cyst    Estimated Blood Loss: Minimal    Specimens: None    Implants:   Implant Name Type Inv. Item Serial No.  Lot No. LRB No. Used Action   LPT REGULAR GREAT TOE SZ 2    [employer] [id number] Left 1 Implanted       Injectables: None         Complications:  None; patient tolerated the procedure well.           Disposition: PACU - hemodynamically stable.           Condition: stable    Operative Note:   Summary: Patient presents to [facility] for surgical intervention of her left foot. Pt has failed conservative modalities and has opted for surgical intervention at this time. In pre-op the patients vital signs are stable and neurovascular status is intact.     The patient was transferred to the operating room and placed on the operating room table in the supine position.  The correct surgical site was identified which is the left foot. A well padded pneumatic ankle tourniquet was then placed around the left ankle but not elevated at this time. Once anesthesia was achieved, the above mentioned blocked was administered. The foot and ankle were sterilely prepped and draped in the usual  aseptic technique and a timeout was performed before the beginning of the procedure. The tourniquet was then elevated to 250 mmHg.    Procedure:    Attention was drawn to the left first metatarsal phalangeal joint dorsally.  A 15 blade was utilized to create an approximate 6 cm incision over the shaft of the proximal phalanx over the MPJ and proximal to the metatarsal head.  The incision was created just medial to the central aspect of the hallux and was carried down to the level of the subcutaneous tissue.  Once down to the level of the subcutaneous tissue a curved Metzenbaums were utilized to bluntly dissect away the subcutaneous tissue from the underlying metatarsal and proximal phalanx.  With the soft tissue freed the flaps were then retracted medially and laterally to help in identification of the EHL tendon as well as the dorsal joint capsule of the MTPJ.  With the EHL tendon identified, the incision was further carried out through the capsule down to the bone just medial along the course of the EHL tendon.  Once down to the level of bone the capsule and periosteum was carefully reflected from the bone and lifted through the use of the 15 blade and forceps.  Once through the capsule it was at this time that thick viscous synovial fluid with a resemblance to cyst was identified.  With the capsule carefully reflected from the bone it was then retracted medially and laterally from the surgical incision.  With the bone properly exposed the metatarsal head and proximal phalanx base were now accessible.  With the EHL tendon carefully protected, the sagittal saw was then utilized to excise the hypertrophied medial eminence and dorsal aspect of the metatarsal head removing any bony spurs followed by a rongeur to further clean the area.  The sagittal saw was then utilized to transect the proximal phalanx base in a dorsal to plantar fashion perpendicular with the proximal phalanx shaft.  Fluoroscopy was utilized to  confirm the cut was appropriate for the implant.  Fluoroscopy confirmed that there was necessary bone excised and the cut was properly angulated.  With the bone now prepped the implant guide was inserted into the joint along the proximal phalanx base and the guide was used to obtain appropriate sizing.  A temporary implant was placed.  Excellent sizing was noted.  The temporary implant was removed and the appropriate tamp for the implant was inserted and removed.  The final jose e-implant was placed into the proximal phalanx.  Using the bumper for the implant as well as a mallet it was lightly tapped in to the proximal phalanx.  There was excellent bone to implant apposition.  The implant was very well seated.  It was appreciated that the implant did not penetrate through the proximal phalanx cortices plantarly.  Following completion the site was then copiously irrigated with sterile saline and a bulb syringe.  2-0 Vicryl was then utilized for closure of the capsule.  3-0 Vicryl was utilized for subcutaneous closure.  4-0 Monocryl was utilized for subdermal closure and Steri-Strips were utilized over the skin.  The tourniquet was deflated and prompt hyperemic response was noted.    The site was then dressed with Xeroform, sterile 4 x 4 gauze, Kerlix, Webril and Coban.    The patient tolerated the procedure well with all vital signs stable and neurovascular status intact. Once aroused from anesthesia the patient was transferred from the operating room to PACU and discharged home per protocol.    Patient is instructed to be non-weight bearing to the left lower extremity and to follow-up with Dr. Dow in 1 week after the procedure. Patient was also given a script for percocet 5/325mg as needed pain.       Pepe Dow DPM  Phone Number: [phone]

## 2023-09-14 NOTE — ANESTHESIA POSTPROCEDURE EVALUATION
Patient: Sia Aguilar    Procedure Summary     Date: 09/12/23 Room / Location: [facility] OR  / [facility] OR [facility]    Anesthesia Start: 1459 Anesthesia Stop: 1713    Procedure: Bunion With Implant (Left: Toes) Diagnosis:       Hallux rigidus of left foot      (Hallux rigidus of left foot [M20.22])    Surgeons: Pepe Dow DPM Responsible Provider: Mary Ann Corona MD    Anesthesia Type: general ASA Status: 1          Anesthesia Type: general  PACU Vitals  9/12/2023 1708 - 9/12/2023 1808      9/12/2023  1711 9/12/2023  1715 9/12/2023  1717 9/12/2023  1720    BP: 119/56 119/56 -- 137/63    Temp: 35.7 °C (96.2 °F) -- -- --    Pulse: 60 61 56 57    Resp: 12 14 19 16    SpO2: 100 % 96 % 100 % 100 %              9/12/2023  1730 9/12/2023  1745 9/12/2023  1800       BP: 140/65 134/71 127/73     Temp: -- -- 36.1 °C (97 °F)     Pulse: 51 51 57     Resp: 20 15 20     SpO2: 100 % 100 % 100 %             Anesthesia Post Evaluation    Pain management: adequate  Patient location during evaluation: PACU  Patient participation: complete - patient participated  Level of consciousness: awake and alert  Cardiovascular status: acceptable  Airway Patency: adequate  Respiratory status: acceptable  Hydration status: acceptable  Anesthetic complications: no

## 2023-11-03 NOTE — PROGRESS NOTES
Assessment/Plan:    Encounter for gynecological examination (general) (routine) without abnormal findings  Here for well check, no breast or gyn complaints. Normal breast and pelvic exams with palpable right ovary vs loop of bowel. Last pap 2021 neg/HPV neg; will repeat by . Ultrasound ordered  Mammo order given, last 22 BIRADS 1D; insurance has declined breast u/s in past; declines order today  Colonoscopy , due   Dexa 22 Spine, hip normal. Fem neck T-1.9, low risk of fracture. Diagnoses and all orders for this visit:    Encounter for gynecological examination (general) (routine) without abnormal findings    Extremely dense tissue of both breasts on mammography    Encounter for screening mammogram for malignant neoplasm of breast  -     Mammo screening bilateral w 3d & cad; Future    Other ovarian cyst, right side  -     US pelvis complete w transvaginal; Future          Subjective:      Patient ID: Marlyn Rodriguez is a 52 y.o. female. HPI Here for well check. The following portions of the patient's history were reviewed and updated as appropriate: She  has a past medical history of  2 para 2, History of pelvic ultrasound (2020), and Papanicolaou smear (2017). She   Patient Active Problem List    Diagnosis Date Noted    Encounter for gynecological examination (general) (routine) without abnormal findings 2021    Dense breast tissue on mammogram 2021     She  has a past surgical history that includes [place] tooth extraction; Mammo (historical) (Bilateral, 2020); Colonoscopy (2018); and Foot surgery. Her family history is not on file. She  reports that she has never smoked. She has never been exposed to tobacco smoke. She has never used smokeless tobacco. She reports current alcohol use. She reports that she does not use drugs.   Current Outpatient Medications   Medication Sig Dispense Refill    ALPRAZolam (XANAX) 0.25 mg tablet as needed       Boswellia-Glucosamine-Vit D (OSTEO BI-FLEX ONE PER DAY PO) Take 1 tablet by mouth daily      Calcium Carb-Cholecalciferol (CALCIUM 1000 + D PO) Take 1 tablet by mouth 2 (two) times a day      Calcium Polycarbophil (FIBER-CAPS PO) Take 2 capsules by mouth daily      escitalopram (LEXAPRO) 10 mg tablet       loratadine (CLARITIN) 10 mg tablet Take 10 mg by mouth daily      magnesium 30 MG tablet Take 30 mg by mouth 2 (two) times a day      Multiple Vitamins-Minerals (DAILY MULTI PO) Take 1 tablet by mouth daily      Probiotic Product (PROBIOTIC DAILY PO) Take 1 capsule by mouth daily       No current facility-administered medications for this visit. She has No Known Allergies. .    Review of Systems  No PMB, breast, bladder, bowel changes.  No new persistent pain, bloating, early satiety or pelvic pressure      Objective:      /72 (BP Location: Left arm, Patient Position: Sitting, Cuff Size: Standard)   Ht 5' 1" (1.549 m)   Wt 52.2 kg (115 lb)   LMP 07/27/2021 (Exact Date)   BMI 21.73 kg/m²          Physical Exam    General appearance: no distress, pleasant  Neck: thyroid without nodules or thyromegaly, no palpable adenopathy  Lymph nodes: no palpable adenopathy  Breasts: no masses, nodes or skin changes  Abdomen: soft, non tender, no palpable masses  Pelvic exam: normal atrophic external genitalia, urethral meatus normal, vagina atrophic without lesions, cervix atrophic without lesions, uterus small, non tender, right 3-4 cm mobile ?ovary ?loop of bowel, no adnexal masses, non tender  Rectal exam: normal sphincter tone, no masses, RV confirms above

## 2023-11-06 ENCOUNTER — ANNUAL EXAM (OUTPATIENT)
Dept: OBGYN CLINIC | Facility: CLINIC | Age: 53
End: 2023-11-06
Payer: COMMERCIAL

## 2023-11-06 VITALS
BODY MASS INDEX: 21.71 KG/M2 | WEIGHT: 115 LBS | HEIGHT: 61 IN | SYSTOLIC BLOOD PRESSURE: 112 MMHG | DIASTOLIC BLOOD PRESSURE: 72 MMHG

## 2023-11-06 DIAGNOSIS — Z01.419 ENCOUNTER FOR GYNECOLOGICAL EXAMINATION (GENERAL) (ROUTINE) WITHOUT ABNORMAL FINDINGS: Primary | ICD-10-CM

## 2023-11-06 DIAGNOSIS — Z12.31 ENCOUNTER FOR SCREENING MAMMOGRAM FOR MALIGNANT NEOPLASM OF BREAST: ICD-10-CM

## 2023-11-06 DIAGNOSIS — R92.343 EXTREMELY DENSE TISSUE OF BOTH BREASTS ON MAMMOGRAPHY: ICD-10-CM

## 2023-11-06 DIAGNOSIS — N83.291 OTHER OVARIAN CYST, RIGHT SIDE: ICD-10-CM

## 2023-11-06 PROCEDURE — 99396 PREV VISIT EST AGE 40-64: CPT | Performed by: OBSTETRICS & GYNECOLOGY

## 2023-11-06 NOTE — ASSESSMENT & PLAN NOTE
Here for well check, no breast or gyn complaints. Normal breast and pelvic exams with palpable right ovary vs loop of bowel. Last pap 8/2021 neg/HPV neg; will repeat by 2026. Ultrasound ordered  Mammo order given, last 9/13/22 BIRADS 1D; insurance has declined breast u/s in past; declines order today  Colonoscopy 2018, due 2028  Dexa 9/13/22 Spine, hip normal. Fem neck T-1.9, low risk of fracture.

## 2023-11-06 NOTE — LETTER
2023     DO Caleb Mittal  [address]    Patient: Irene Schofield   YOB: 1970   Date of Visit: 2023       Dear Dr. Viviana Medel:    Aylin Akbar was in today for her annual gyn exam. Below are my notes for this consultation. If you have questions, please do not hesitate to call me. I look forward to following your patient along with you. Sincerely,        Dayne Han MD        CC: No Recipients    Dayne Han MD  2023  1:08 PM  Sign when Signing Visit  Assessment/Plan:    Encounter for gynecological examination (general) (routine) without abnormal findings  Here for well check, no breast or gyn complaints. Normal breast and pelvic exams with palpable right ovary vs loop of bowel. Last pap 2021 neg/HPV neg; will repeat by . Ultrasound ordered  Mammo order given, last 22 BIRADS 1D; insurance has declined breast u/s in past; declines order today  Colonoscopy , due   Dexa 22 Spine, hip normal. Fem neck T-1.9, low risk of fracture. Diagnoses and all orders for this visit:    Encounter for gynecological examination (general) (routine) without abnormal findings    Extremely dense tissue of both breasts on mammography    Encounter for screening mammogram for malignant neoplasm of breast  -     Mammo screening bilateral w 3d & cad; Future    Other ovarian cyst, right side  -     US pelvis complete w transvaginal; Future          Subjective:      Patient ID: Irene Schofield is a 52 y.o. female. HPI Here for well check. The following portions of the patient's history were reviewed and updated as appropriate: She  has a past medical history of  2 para 2, History of pelvic ultrasound (2020), and Papanicolaou smear (2017).   She   Patient Active Problem List    Diagnosis Date Noted   • Encounter for gynecological examination (general) (routine) without abnormal findings 2021   • Dense breast tissue on mammogram 07/31/2021     She  has a past surgical history that includes [place] tooth extraction; Mammo (historical) (Bilateral, 06/17/2020); Colonoscopy (11/2018); and Foot surgery. Her family history is not on file. She  reports that she has never smoked. She has never been exposed to tobacco smoke. She has never used smokeless tobacco. She reports current alcohol use. She reports that she does not use drugs. Current Outpatient Medications   Medication Sig Dispense Refill   • ALPRAZolam (XANAX) 0.25 mg tablet as needed      • [place]-Glucosamine-Vit D (OSTEO BI-FLEX ONE PER DAY PO) Take 1 tablet by mouth daily     • Calcium Carb-Cholecalciferol (CALCIUM 1000 + D PO) Take 1 tablet by mouth 2 (two) times a day     • Calcium Polycarbophil (FIBER-CAPS PO) Take 2 capsules by mouth daily     • escitalopram (LEXAPRO) 10 mg tablet      • loratadine (CLARITIN) 10 mg tablet Take 10 mg by mouth daily     • magnesium 30 MG tablet Take 30 mg by mouth 2 (two) times a day     • Multiple Vitamins-Minerals (DAILY MULTI PO) Take 1 tablet by mouth daily     • Probiotic Product (PROBIOTIC DAILY PO) Take 1 capsule by mouth daily       No current facility-administered medications for this visit. She has No Known Allergies. .    Review of Systems  No PMB, breast, bladder, bowel changes.  No new persistent pain, bloating, early satiety or pelvic pressure      Objective:      /72 (BP Location: Left arm, Patient Position: Sitting, Cuff Size: Standard)   Ht 5' 1" (1.549 m)   Wt 52.2 kg (115 lb)   LMP 07/27/2021 (Exact Date)   BMI 21.73 kg/m²          Physical Exam    General appearance: no distress, pleasant  Neck: thyroid without nodules or thyromegaly, no palpable adenopathy  Lymph nodes: no palpable adenopathy  Breasts: no masses, nodes or skin changes  Abdomen: soft, non tender, no palpable masses  Pelvic exam: normal atrophic external genitalia, urethral meatus normal, vagina atrophic without lesions, cervix atrophic without lesions, uterus small, non tender, right 3-4 cm mobile ?ovary ?loop of bowel, no adnexal masses, non tender  Rectal exam: normal sphincter tone, no masses, RV confirms above

## 2023-11-17 ENCOUNTER — TELEPHONE (OUTPATIENT)
Dept: OBGYN CLINIC | Facility: CLINIC | Age: 53
End: 2023-11-17

## 2023-11-17 NOTE — TELEPHONE ENCOUNTER
Message left on cell with u/s results. Ordered to r/o right ovarian cyst - normal right ovary seen, left ovary not visualized. EMS 11 mm, with foci of doppler flow (?possible polyp?). Options to repeat imaging in 2-3 months, vs SHG, vs hysteroscopy D&C briefly discussed. Encouraged call back to formulate plan.

## 2023-11-20 ENCOUNTER — TELEPHONE (OUTPATIENT)
Dept: OBGYN CLINIC | Facility: CLINIC | Age: 53
End: 2023-11-20

## 2023-11-20 DIAGNOSIS — R93.89 THICKENED ENDOMETRIUM: Primary | ICD-10-CM

## 2023-11-20 NOTE — TELEPHONE ENCOUNTER
Spoke with [place]. No PMB. Ultrasound reviewed with options. Will repeat in 2-3 months for possible, asymptomatic polyp. Discussed low risk of neoplasm in polyps in general and especially without PMB. Agrees to plan.    Will send request.

## 2024-01-03 DIAGNOSIS — Z12.31 ENCOUNTER FOR SCREENING MAMMOGRAM FOR MALIGNANT NEOPLASM OF BREAST: ICD-10-CM

## 2024-02-06 ENCOUNTER — TELEPHONE (OUTPATIENT)
Dept: OBGYN CLINIC | Facility: CLINIC | Age: 54
End: 2024-02-06

## 2024-02-06 PROBLEM — R93.89 ENDOMETRIAL THICKENING ON ULTRASOUND: Status: ACTIVE | Noted: 2024-02-06

## 2024-02-06 NOTE — TELEPHONE ENCOUNTER
Spoke with Patricia. Continues without PMB. U/s initially ordered after well check to r/o ovarian cyst on 11/10/23 found to have thickened EMS - 11 mm. Follow up in 2 months - 1/30/24 with 7.3 cm uterus and EMS 11 mm, ovaries not visualized.   Options reviewed - continued f/u imaging, endo bx in office, hysteroscopy, D&C for possible polypectomy and sampling. Would like to proceed with D&C.   Will RTO for consent prior to scheduling.

## 2024-02-06 NOTE — PROGRESS NOTES
Assessment/Plan:    Endometrial thickening on ultrasound  54 yo  without PMB who had ultrasound after 11/10/23 well check to r/o ovarian cyst. Was found to have thickened EMS - 11 mm. Follow up in 2 months recommended.   Repeat imaging  24 with 7.3 cm uterus and EMS 11 mm, ovaries not visualized.   Options reviewed - continued f/u imaging, endo bx in office, hysteroscopy, D&C for possible polypectomy and sampling. Would like to proceed with D&C.   R&B of procedure reviewed including bleeding, infection, uterine perforation (quoted 1:200-500) and potential need for laparoscopy with perforation. Questions answered and consent signed.       Diagnoses and all orders for this visit:    Endometrial thickening on ultrasound       Subjective:      Patient ID: Patricia Medina is a 53 y.o. female.    HPI here to follow up on imaging.    The following portions of the patient's history were reviewed and updated as appropriate: She  has a past medical history of  2 para 2, History of pelvic ultrasound (2020), and Papanicolaou smear (2017).  She   Patient Active Problem List    Diagnosis Date Noted    Endometrial thickening on ultrasound 2024    Encounter for gynecological examination (general) (routine) without abnormal findings 2021    Dense breast tissue on mammogram 2021     She  has a past surgical history that includes [place] tooth extraction; Mammo (historical) (Bilateral, 2020); Colonoscopy (2018); and Foot surgery (Left, ).  Her family history is not on file.  She  reports that she has never smoked. She has never been exposed to tobacco smoke. She has never used smokeless tobacco. She reports current alcohol use. She reports that she does not use drugs.  Current Outpatient Medications   Medication Sig Dispense Refill    ALPRAZolam (XANAX) 0.25 mg tablet as needed       Calcium Carb-Cholecalciferol (CALCIUM 1000 + D PO) Take 1 tablet by mouth 2 (two) times a  "day      escitalopram (LEXAPRO) 10 mg tablet Take 15 mg by mouth daily      loratadine (CLARITIN) 10 mg tablet Take 10 mg by mouth daily      magnesium 30 MG tablet Take 30 mg by mouth 2 (two) times a day      Multiple Vitamins-Minerals (DAILY MULTI PO) Take 1 tablet by mouth daily (Patient not taking: Reported on 2/8/2024)       No current facility-administered medications for this visit.     She has No Known Allergies..    Review of Systems  No PMB    Objective:      /74 (BP Location: Left arm, Patient Position: Sitting, Cuff Size: Standard)   Ht 5' 1\" (1.549 m)   Wt 52.6 kg (116 lb)   LMP 07/27/2021 (Exact Date)   BMI 21.92 kg/m²          Physical Exam    Appears well, no apparent distress.   Does not appear anxious or depressed.  HEENT: normocephalic, EOEMI, oral pharynx clear  Lungs CTA  CVS RRR  LE - no edema, nontender  Pelvic from 11/6/23: normal atrophic external genitalia, urethral meatus normal, vagina atrophic without lesions, cervix atrophic without lesions, uterus small, non tender, right 3-4 cm mobile ?ovary ?loop of bowel, no adnexal masses, non tender  Rectal exam: normal sphincter tone, no masses, RV confirms above        Data:  1/30/24 u/s: AV uterus 7.3 cm with posterior 1.6 cm fibroid. EMS 11 mm, unchanged. Ovaries not visualized.  Imaging reviewed with endometrial cystic change, no definitive polyp seen  Compared to 11/2023 imaging, no change in EMS, 11 mm.  "

## 2024-02-08 ENCOUNTER — CONSULT (OUTPATIENT)
Dept: OBGYN CLINIC | Facility: CLINIC | Age: 54
End: 2024-02-08
Payer: COMMERCIAL

## 2024-02-08 ENCOUNTER — PREP FOR PROCEDURE (OUTPATIENT)
Dept: OBGYN CLINIC | Facility: CLINIC | Age: 54
End: 2024-02-08

## 2024-02-08 VITALS
DIASTOLIC BLOOD PRESSURE: 74 MMHG | WEIGHT: 116 LBS | SYSTOLIC BLOOD PRESSURE: 116 MMHG | BODY MASS INDEX: 21.9 KG/M2 | HEIGHT: 61 IN

## 2024-02-08 DIAGNOSIS — R93.89 THICKENED ENDOMETRIUM: Primary | ICD-10-CM

## 2024-02-08 DIAGNOSIS — R93.89 ENDOMETRIAL THICKENING ON ULTRASOUND: Primary | ICD-10-CM

## 2024-02-08 PROCEDURE — 99214 OFFICE O/P EST MOD 30 MIN: CPT | Performed by: OBSTETRICS & GYNECOLOGY

## 2024-02-08 NOTE — H&P
Endometrial thickening on ultrasound  52 yo  without PMB who had ultrasound after 11/10/23 well check to r/o ovarian cyst. Was found to have thickened EMS - 11 mm. Follow up in 2 months recommended.   Repeat imaging  24 with 7.3 cm uterus and EMS 11 mm, ovaries not visualized.   Options reviewed - continued f/u imaging, endo bx in office, hysteroscopy, D&C for possible polypectomy and sampling. Would like to proceed with D&C.   R&B of procedure reviewed including bleeding, infection, uterine perforation (quoted 1:200-500) and potential need for laparoscopy with perforation. Questions answered and consent signed.         Diagnoses and all orders for this visit:     Endometrial thickening on ultrasound     Subjective:       Patient ID: Patricia Medina is a 53 y.o. female.     HPI here to follow up on imaging.     The following portions of the patient's history were reviewed and updated as appropriate: She  has a past medical history of  2 para 2, History of pelvic ultrasound (2020), and Papanicolaou smear (2017).  She        Patient Active Problem List     Diagnosis Date Noted    Endometrial thickening on ultrasound 2024    Encounter for gynecological examination (general) (routine) without abnormal findings 2021    Dense breast tissue on mammogram 2021      She  has a past surgical history that includes [place] tooth extraction; Mammo (historical) (Bilateral, 2020); Colonoscopy (2018); and Foot surgery (Left, ).  Her family history is not on file.  She  reports that she has never smoked. She has never been exposed to tobacco smoke. She has never used smokeless tobacco. She reports current alcohol use. She reports that she does not use drugs.         Current Outpatient Medications   Medication Sig Dispense Refill    ALPRAZolam (XANAX) 0.25 mg tablet as needed         Calcium Carb-Cholecalciferol (CALCIUM 1000 + D PO) Take 1 tablet by mouth 2 (two) times a  "day        escitalopram (LEXAPRO) 10 mg tablet Take 15 mg by mouth daily        loratadine (CLARITIN) 10 mg tablet Take 10 mg by mouth daily        magnesium 30 MG tablet Take 30 mg by mouth 2 (two) times a day        Multiple Vitamins-Minerals (DAILY MULTI PO) Take 1 tablet by mouth daily (Patient not taking: Reported on 2/8/2024)          No current facility-administered medications for this visit.      She has No Known Allergies..     Review of Systems  No PMB     Objective:        /74 (BP Location: Left arm, Patient Position: Sitting, Cuff Size: Standard)   Ht 5' 1\" (1.549 m)   Wt 52.6 kg (116 lb)   LMP 07/27/2021 (Exact Date)   BMI 21.92 kg/m²             Physical Exam    Appears well, no apparent distress.   Does not appear anxious or depressed.  HEENT: normocephalic, EOEMI, oral pharynx clear  Lungs CTA  CVS RRR  LE - no edema, nontender  Pelvic from 11/6/23: normal atrophic external genitalia, urethral meatus normal, vagina atrophic without lesions, cervix atrophic without lesions, uterus small, non tender, right 3-4 cm mobile ?ovary ?loop of bowel, no adnexal masses, non tender  Rectal exam: normal sphincter tone, no masses, RV confirms above           Data:  1/30/24 u/s: AV uterus 7.3 cm with posterior 1.6 cm fibroid. EMS 11 mm, unchanged. Ovaries not visualized.  Imaging reviewed with endometrial cystic change, no definitive polyp seen  Compared to 11/2023 imaging, no change in EMS, 11 mm.  "

## 2024-02-08 NOTE — ASSESSMENT & PLAN NOTE
54 yo  without PMB who had ultrasound after 11/10/23 well check to r/o ovarian cyst. Was found to have thickened EMS - 11 mm. Follow up in 2 months recommended.   Repeat imaging  24 with 7.3 cm uterus and EMS 11 mm, ovaries not visualized.   Options reviewed - continued f/u imaging, endo bx in office, hysteroscopy, D&C for possible polypectomy and sampling. Would like to proceed with D&C.   R&B of procedure reviewed including bleeding, infection, uterine perforation (quoted 1:200-500) and potential need for laparoscopy with perforation. Questions answered and consent signed.

## 2024-02-15 ENCOUNTER — TELEPHONE (OUTPATIENT)
Dept: GYNECOLOGY | Facility: CLINIC | Age: 54
End: 2024-02-15

## 2024-02-21 PROBLEM — Z01.419 ENCOUNTER FOR GYNECOLOGICAL EXAMINATION (GENERAL) (ROUTINE) WITHOUT ABNORMAL FINDINGS: Status: RESOLVED | Noted: 2021-08-03 | Resolved: 2024-02-21

## 2024-03-11 ENCOUNTER — APPOINTMENT (OUTPATIENT)
Dept: LAB | Facility: HOSPITAL | Age: 54
End: 2024-03-11
Payer: COMMERCIAL

## 2024-03-11 DIAGNOSIS — R93.89 ABNORMAL RADIOLOGICAL FINDINGS IN SKIN AND SUBCUTANEOUS TISSUE: ICD-10-CM

## 2024-03-11 DIAGNOSIS — R93.89 THICKENED ENDOMETRIUM: ICD-10-CM

## 2024-03-11 LAB
ALBUMIN SERPL BCP-MCNC: 4.5 G/DL (ref 3.5–5)
ALP SERPL-CCNC: 59 U/L (ref 34–104)
ALT SERPL W P-5'-P-CCNC: 12 U/L (ref 7–52)
ANION GAP SERPL CALCULATED.3IONS-SCNC: 7 MMOL/L
AST SERPL W P-5'-P-CCNC: 13 U/L (ref 13–39)
BILIRUB SERPL-MCNC: 0.47 MG/DL (ref 0.2–1)
BUN SERPL-MCNC: 19 MG/DL (ref 5–25)
CALCIUM SERPL-MCNC: 9.7 MG/DL (ref 8.4–10.2)
CHLORIDE SERPL-SCNC: 101 MMOL/L (ref 96–108)
CO2 SERPL-SCNC: 26 MMOL/L (ref 21–32)
CREAT SERPL-MCNC: 0.78 MG/DL (ref 0.6–1.3)
ERYTHROCYTE [DISTWIDTH] IN BLOOD BY AUTOMATED COUNT: 13.1 % (ref 11.6–15.1)
GFR SERPL CREATININE-BSD FRML MDRD: 85 ML/MIN/1.73SQ M
GLUCOSE SERPL-MCNC: 85 MG/DL (ref 65–140)
HCT VFR BLD AUTO: 38.7 % (ref 34.8–46.1)
HGB BLD-MCNC: 13.1 G/DL (ref 11.5–15.4)
MCH RBC QN AUTO: 30.9 PG (ref 26.8–34.3)
MCHC RBC AUTO-ENTMCNC: 33.9 G/DL (ref 31.4–37.4)
MCV RBC AUTO: 91 FL (ref 82–98)
PLATELET # BLD AUTO: 231 THOUSANDS/UL (ref 149–390)
PMV BLD AUTO: 12.2 FL (ref 8.9–12.7)
POTASSIUM SERPL-SCNC: 4.9 MMOL/L (ref 3.5–5.3)
PROT SERPL-MCNC: 7 G/DL (ref 6.4–8.4)
RBC # BLD AUTO: 4.24 MILLION/UL (ref 3.81–5.12)
SODIUM SERPL-SCNC: 134 MMOL/L (ref 135–147)
WBC # BLD AUTO: 6.67 THOUSAND/UL (ref 4.31–10.16)

## 2024-03-11 PROCEDURE — 80053 COMPREHEN METABOLIC PANEL: CPT

## 2024-03-11 PROCEDURE — 36415 COLL VENOUS BLD VENIPUNCTURE: CPT

## 2024-03-11 PROCEDURE — 85027 COMPLETE CBC AUTOMATED: CPT

## 2024-03-13 LAB
ATRIAL RATE: 56 BPM
P AXIS: 64 DEGREES
PR INTERVAL: 170 MS
QRS AXIS: 55 DEGREES
QRSD INTERVAL: 84 MS
QT INTERVAL: 416 MS
QTC INTERVAL: 401 MS
T WAVE AXIS: 54 DEGREES
VENTRICULAR RATE: 56 BPM

## 2024-03-26 ENCOUNTER — TELEPHONE (OUTPATIENT)
Age: 54
End: 2024-03-26

## 2024-03-26 NOTE — PRE-PROCEDURE INSTRUCTIONS
Pre-Surgery Instructions:   Medication Instructions    ALPRAZolam (XANAX) 0.25 mg tablet Uses PRN- OK to take day of surgery    Calcium Carb-Cholecalciferol (CALCIUM 1000 + D PO) Hold day of surgery.    escitalopram (LEXAPRO) 10 mg tablet Take day of surgery.    loratadine (CLARITIN) 10 mg tablet Take day of surgery.    magnesium 30 MG tablet Take night before surgery   Medication instructions for day surgery reviewed. Please use only a sip of water to take your instructed medications. Avoid all over the counter vitamins, supplements and NSAIDS for one week prior to surgery per anesthesia guidelines. Tylenol is ok to take as needed.     You will receive a call one business day prior to surgery with an arrival time and hospital directions. If your surgery is scheduled on a Monday, the hospital will be calling you on the Friday prior to your surgery. If you have not heard from anyone by 8pm, please call the hospital supervisor through the hospital  at [phone]. ([place] [phone] or [place] [phone]).    Do not eat or drink anything after midnight the night before your surgery, including candy, mints, lifesavers, or chewing gum. Do not drink alcohol 24hrs before your surgery. Try not to smoke at least 24hrs before your surgery.       Follow the pre surgery showering instructions as listed in the “My Surgical Experience Booklet” or otherwise provided by your surgeon's office. Do not use a blade to shave the surgical area 1 week before surgery. It is okay to use a clean electric clippers up to 24 hours before surgery. Do not apply any lotions, creams, including makeup, cologne, deodorant, or perfumes after showering on the day of your surgery. Do not use dry shampoo, hair spray, hair gel, or any type of hair products.     No contact lenses, eye make-up, or artificial eyelashes. Remove nail polish, including gel polish, and any artificial, gel, or acrylic nails if possible. Remove all jewelry  including rings and body piercing jewelry.     Wear causal clothing that is easy to take on and off. Consider your type of surgery.    Keep any valuables, jewelry, piercings at home. Please bring any specially ordered equipment (sling, braces) if indicated.    Arrange for a responsible person to drive you to and from the hospital on the day of your surgery. Please confirm the visitor policy for the day of your procedure when you receive your phone call with an arrival time.     Call the surgeon's office with any new illnesses, exposures, or additional questions prior to surgery.    Please reference your “My Surgical Experience Booklet” for additional information to prepare for your upcoming surgery.

## 2024-03-26 NOTE — TELEPHONE ENCOUNTER
Pt called stating she had a message on her machine this past Friday about her upcoming procedure. Did not have much info in message or who called pt wanted to touch base to see if she needed to know anything to prepare for procedure asked for a call back to further discuss.

## 2024-03-27 ENCOUNTER — ANESTHESIA EVENT (OUTPATIENT)
Dept: PERIOP | Facility: HOSPITAL | Age: 54
End: 2024-03-27
Payer: COMMERCIAL

## 2024-03-28 ENCOUNTER — ANESTHESIA (OUTPATIENT)
Dept: PERIOP | Facility: HOSPITAL | Age: 54
End: 2024-03-28
Payer: COMMERCIAL

## 2024-03-28 ENCOUNTER — HOSPITAL ENCOUNTER (OUTPATIENT)
Facility: HOSPITAL | Age: 54
Setting detail: OUTPATIENT SURGERY
Discharge: HOME | End: 2024-03-28
Attending: OBSTETRICS & GYNECOLOGY | Admitting: OBSTETRICS & GYNECOLOGY
Payer: COMMERCIAL

## 2024-03-28 VITALS
OXYGEN SATURATION: 100 % | BODY MASS INDEX: 21.52 KG/M2 | HEART RATE: 50 BPM | RESPIRATION RATE: 18 BRPM | TEMPERATURE: 97.2 F | HEIGHT: 61 IN | SYSTOLIC BLOOD PRESSURE: 118 MMHG | WEIGHT: 114 LBS | DIASTOLIC BLOOD PRESSURE: 68 MMHG

## 2024-03-28 DIAGNOSIS — R93.89 THICKENED ENDOMETRIUM: ICD-10-CM

## 2024-03-28 PROBLEM — F41.9 ANXIETY: Status: ACTIVE | Noted: 2024-03-28

## 2024-03-28 PROCEDURE — 58558 HYSTEROSCOPY BIOPSY: CPT | Performed by: OBSTETRICS & GYNECOLOGY

## 2024-03-28 PROCEDURE — 88305 TISSUE EXAM BY PATHOLOGIST: CPT | Performed by: PATHOLOGY

## 2024-03-28 RX ORDER — FENTANYL CITRATE 50 UG/ML
INJECTION, SOLUTION INTRAMUSCULAR; INTRAVENOUS AS NEEDED
Status: DISCONTINUED | OUTPATIENT
Start: 2024-03-28 | End: 2024-03-28

## 2024-03-28 RX ORDER — SODIUM CHLORIDE, SODIUM LACTATE, POTASSIUM CHLORIDE, CALCIUM CHLORIDE 600; 310; 30; 20 MG/100ML; MG/100ML; MG/100ML; MG/100ML
INJECTION, SOLUTION INTRAVENOUS CONTINUOUS PRN
Status: DISCONTINUED | OUTPATIENT
Start: 2024-03-28 | End: 2024-03-28

## 2024-03-28 RX ORDER — KETOROLAC TROMETHAMINE 30 MG/ML
INJECTION, SOLUTION INTRAMUSCULAR; INTRAVENOUS AS NEEDED
Status: DISCONTINUED | OUTPATIENT
Start: 2024-03-28 | End: 2024-03-28

## 2024-03-28 RX ORDER — ONDANSETRON 2 MG/ML
4 INJECTION INTRAMUSCULAR; INTRAVENOUS ONCE AS NEEDED
OUTPATIENT
Start: 2024-03-28

## 2024-03-28 RX ORDER — PROPOFOL 10 MG/ML
INJECTION, EMULSION INTRAVENOUS CONTINUOUS PRN
Status: DISCONTINUED | OUTPATIENT
Start: 2024-03-28 | End: 2024-03-28

## 2024-03-28 RX ORDER — ONDANSETRON 2 MG/ML
INJECTION INTRAMUSCULAR; INTRAVENOUS AS NEEDED
Status: DISCONTINUED | OUTPATIENT
Start: 2024-03-28 | End: 2024-03-28

## 2024-03-28 RX ORDER — MIDAZOLAM HYDROCHLORIDE 2 MG/2ML
INJECTION, SOLUTION INTRAMUSCULAR; INTRAVENOUS AS NEEDED
Status: DISCONTINUED | OUTPATIENT
Start: 2024-03-28 | End: 2024-03-28

## 2024-03-28 RX ORDER — LIDOCAINE HYDROCHLORIDE 10 MG/ML
INJECTION, SOLUTION EPIDURAL; INFILTRATION; INTRACAUDAL; PERINEURAL AS NEEDED
Status: DISCONTINUED | OUTPATIENT
Start: 2024-03-28 | End: 2024-03-28 | Stop reason: HOSPADM

## 2024-03-28 RX ORDER — FENTANYL CITRATE/PF 50 MCG/ML
25 SYRINGE (ML) INJECTION
OUTPATIENT
Start: 2024-03-28

## 2024-03-28 RX ORDER — DEXAMETHASONE SODIUM PHOSPHATE 10 MG/ML
INJECTION, SOLUTION INTRAMUSCULAR; INTRAVENOUS AS NEEDED
Status: DISCONTINUED | OUTPATIENT
Start: 2024-03-28 | End: 2024-03-28

## 2024-03-28 RX ORDER — PROPOFOL 10 MG/ML
INJECTION, EMULSION INTRAVENOUS AS NEEDED
Status: DISCONTINUED | OUTPATIENT
Start: 2024-03-28 | End: 2024-03-28

## 2024-03-28 RX ORDER — SODIUM CHLORIDE, SODIUM LACTATE, POTASSIUM CHLORIDE, CALCIUM CHLORIDE 600; 310; 30; 20 MG/100ML; MG/100ML; MG/100ML; MG/100ML
125 INJECTION, SOLUTION INTRAVENOUS CONTINUOUS
Status: DISCONTINUED | OUTPATIENT
Start: 2024-03-28 | End: 2024-03-28 | Stop reason: HOSPADM

## 2024-03-28 RX ADMIN — SODIUM CHLORIDE, SODIUM LACTATE, POTASSIUM CHLORIDE, AND CALCIUM CHLORIDE: .6; .31; .03; .02 INJECTION, SOLUTION INTRAVENOUS at 11:25

## 2024-03-28 RX ADMIN — FENTANYL CITRATE 25 MCG: 50 INJECTION, SOLUTION INTRAMUSCULAR; INTRAVENOUS at 11:36

## 2024-03-28 RX ADMIN — FENTANYL CITRATE 25 MCG: 50 INJECTION, SOLUTION INTRAMUSCULAR; INTRAVENOUS at 11:45

## 2024-03-28 RX ADMIN — ONDANSETRON 4 MG: 2 INJECTION INTRAMUSCULAR; INTRAVENOUS at 11:33

## 2024-03-28 RX ADMIN — PROPOFOL 150 MCG/KG/MIN: 10 INJECTION, EMULSION INTRAVENOUS at 11:33

## 2024-03-28 RX ADMIN — PROPOFOL 150 MG: 10 INJECTION, EMULSION INTRAVENOUS at 11:33

## 2024-03-28 RX ADMIN — KETOROLAC TROMETHAMINE 15 MG: 30 INJECTION, SOLUTION INTRAMUSCULAR; INTRAVENOUS at 11:49

## 2024-03-28 RX ADMIN — MIDAZOLAM 2 MG: 1 INJECTION INTRAMUSCULAR; INTRAVENOUS at 11:25

## 2024-03-28 RX ADMIN — DEXAMETHASONE SODIUM PHOSPHATE 10 MG: 10 INJECTION, SOLUTION INTRAMUSCULAR; INTRAVENOUS at 11:33

## 2024-03-28 NOTE — ANESTHESIA POSTPROCEDURE EVALUATION
Post-Op Assessment Note    CV Status:  Stable  Pain Score: 0    Pain management: adequate       Mental Status:  Arousable and sleepy   Hydration Status:  Stable   PONV Controlled:  Controlled   Airway Patency:  Patent     Post Op Vitals Reviewed: Yes    No anethesia notable event occurred.    Staff: CRNA               BP   83/54   Temp 97.6   Pulse 54   Resp 10   SpO2 99

## 2024-03-28 NOTE — DISCHARGE INSTR - AVS FIRST PAGE
Nothing in vagina for 7-10 days: no sex, tampons or douching.  Call for heavy bleeding, fever, pain, any problems.   You may start your ibuprofen today at 6 PM and continue every six hours as needed for cramping. Always take ibuprofen with food and a glass of water. Tylenol between ibuprofen doses can be taken as directed on the bottle if needed.  Call if you do not hear about the results of the pathology in 2 weeks.

## 2024-03-28 NOTE — OP NOTE
OPERATIVE REPORT  PATIENT NAME: Patricia Medina    :  1970  MRN: [id number]  Pt Location:  OR ROOM 04    SURGERY DATE: 3/28/2024    Surgeons and Role:     * Miladis Ruvalcaba MD - Primary    Preop Diagnosis:  Thickened endometrium [R93.89]    Post-Op Diagnosis Codes:     * Thickened endometrium [R93.89]  Endometrial polyps    Procedure(s):  DILATATION AND CURETTAGE (D&C) WITH HYSTEROSCOPY. POLYPECTOMY    Specimen(s):  ID Type Source Tests Collected by Time Destination   1 : [facility] Tissue Cervix, Endocervical TISSUE EXAM Miladis Ruvalcaba MD 3/28/2024 1145    2 : [facility] Tissue Endometrium TISSUE EXAM Miladis Ruvalcaba MD 3/28/2024 1146        Estimated Blood Loss:   Minimal    Drains:  * No LDAs found *    Anesthesia Type:   IV Sedation with Anesthesia, paracervical block    Operative Indications:  Thickened endometrium [R93.89]      Operative Findings:  Endometrial polyps - 1 cm lower uterine segment and <5 mm right cornua. Otherwise normal endometrial and endocervical cavities    Complications:   None    Procedure and Technique:  The patient was brought to the OR and placed in the dorsal lithotomy while awake. After successful IV sedation she was prepped and draped in the usual sterile fashion for vaginal surgery.   A speculum was placed in the vagina and the cervix was grasped with an Allis clamp. A paracervical block was accomplished with 10 cc of plain lidocaine. An endocervical curettage was performed and sent to pathology. The cervix was dilated to accommodate a small hysteroscope. Using saline as the distending medium the endocervical and endometrial cavities were explored with the findings as noted above.  Using the Symphion system the polypoid tissue was removed in the standard fashion. A sharp curettage was then performed with retrieval of tissue with polyp forceps. Specimens were sent to pathology.   Fluid deficit was <50 cc.  The Allis and speculum were removed. Excellent hemostasis was noted.   All sponge and  instrument counts were correct. The patient tolerated the procedure well and was taken to surgical short stay in good condition.      I was present for the entire procedure.    Patient Disposition:  PACU         SIGNATURE: Miladis Ruvalcaba MD  DATE: March 28, 2024  TIME: 11:48 AM

## 2024-03-28 NOTE — ANESTHESIA PREPROCEDURE EVALUATION
Procedure:  DILATATION AND CURETTAGE (D&C) WITH HYSTEROSCOPY, POLYPECTOMY (Uterus)    Relevant Problems   ANESTHESIA (within normal limits)      CARDIO (within normal limits)      GI/HEPATIC   (-) Gastroesophageal reflux disease      NEURO/PSYCH   (+) Anxiety      PULMONARY (within normal limits)   (-) Sleep apnea   (-) Smoking   (-) URI (upper respiratory infection)      Physical Exam    Airway    Mallampati score: I  TM Distance: >3 FB  Neck ROM: full     Dental   No notable dental hx     Cardiovascular      Pulmonary      Other Findings  post-pubertal.    Lab Results   Component Value Date    WBC 6.67 03/11/2024    HGB 13.1 03/11/2024     03/11/2024     Lab Results   Component Value Date    SODIUM 134 (L) 03/11/2024    K 4.9 03/11/2024    BUN 19 03/11/2024    CREATININE 0.78 03/11/2024    EGFR 85 03/11/2024     Anesthesia Plan  ASA Score- 1     Anesthesia Type- IV sedation with anesthesia with ASA Monitors.         Additional Monitors:     Airway Plan:            Plan Factors-Exercise tolerance (METS): >4 METS.    Chart reviewed.   Existing labs reviewed. Patient summary reviewed.    Patient is not a current smoker.              Induction- intravenous.    Postoperative Plan-     Informed Consent- Anesthetic plan and risks discussed with patient.  I personally reviewed this patient with the CRNA. Discussed and agreed on the Anesthesia Plan with the CRNA..

## 2024-04-01 PROCEDURE — 88305 TISSUE EXAM BY PATHOLOGIST: CPT | Performed by: PATHOLOGY

## 2024-11-06 ENCOUNTER — HOSPITAL ENCOUNTER (OUTPATIENT)
Dept: ULTRASOUND IMAGING | Facility: HOSPITAL | Age: 54
Discharge: HOME | End: 2024-11-06
Attending: OBSTETRICS & GYNECOLOGY
Payer: COMMERCIAL

## 2024-11-06 DIAGNOSIS — R93.89 THICKENED ENDOMETRIUM: ICD-10-CM

## 2024-11-06 PROCEDURE — 76856 US EXAM PELVIC COMPLETE: CPT

## 2024-11-06 PROCEDURE — 76830 TRANSVAGINAL US NON-OB: CPT

## 2024-11-22 PROBLEM — R93.89 ENDOMETRIAL THICKENING ON ULTRASOUND: Status: RESOLVED | Noted: 2024-02-06 | Resolved: 2024-11-22

## 2024-11-22 NOTE — PROGRESS NOTES
Assessment/Plan:    Encounter for gynecological examination (general) (routine) without abnormal findings  Here for well check, no breast or gyn complaints  Reviewed normal pelvic u/s from 24 - AV uterus 7.2 cm with 2.1 cm posterior fibroid.EMS 5 mm, R ov 1.7 and L ov 2.0 cm WNL.  Normal breast and pelvic exams.  Last pap 2021 neg/HPV neg; due by   Mammo order given, last 23 BIRADS 1D; declined u/s in past, agrees this year. Order placed.  Colonoscopy , due   Dexa ; fem neck T-1.9, low risk of fracture; calcium recs given; repeat 3-5 years       Diagnoses and all orders for this visit:    Encounter for screening mammogram for breast cancer  -     Mammo screening bilateral w 3d and cad; Future    Encounter for gynecological examination (general) (routine) without abnormal findings    Extremely dense tissue of both breasts on mammography  -     US breast screening bilateral complete (ABUS); Future          Subjective:      Patient ID: Patricia Medina is a 54 y.o. female.    HPI Here for well check.    The following portions of the patient's history were reviewed and updated as appropriate: She  has a past medical history of COVID,  2 para 2, History of pelvic ultrasound (2020), and Papanicolaou smear (2017).  She   Patient Active Problem List    Diagnosis Date Noted    Anxiety 2024    Dense breast tissue on mammogram 2021     She  has a past surgical history that includes [place] tooth extraction; Mammo (historical) (Bilateral, 2020); Colonoscopy (2018); Foot surgery (Left, ); and pr hysteroscopy bx endometrium&/polypc w/wo d&c (N/A, 3/28/2024).  Her family history is not on file.  She  reports that she has never smoked. She has never been exposed to tobacco smoke. She has never used smokeless tobacco. She reports current alcohol use. She reports that she does not use drugs.  Current Outpatient Medications   Medication Sig Dispense Refill     "ALPRAZolam (XANAX) 0.25 mg tablet as needed       Calcium Carb-Cholecalciferol (CALCIUM 1000 + D PO) Take 1 tablet by mouth 2 (two) times a day      escitalopram (LEXAPRO) 10 mg tablet Take 15 mg by mouth daily      loratadine (CLARITIN) 10 mg tablet Take 10 mg by mouth daily      magnesium 30 MG tablet Take 30 mg by mouth every evening      NON FORMULARY daily OTC thyroid supplement       No current facility-administered medications for this visit.     She has no known allergies..    Review of Systems  No PMB, breast, bladder, bowel changes. No new persistent pain, bloating, early satiety or pelvic pressure    Objective:    /72 (BP Location: Left arm, Patient Position: Sitting, Cuff Size: Standard)   Ht 5' 1\" (1.549 m)   Wt 52.8 kg (116 lb 6.4 oz)   LMP 07/27/2021 (Exact Date)   BMI 21.99 kg/m²      Physical Exam    General appearance: no distress, pleasant  Neck: thyroid without nodules or thyromegaly, no palpable adenopathy  Lymph nodes: no palpable adenopathy  Breasts: no masses, nodes or skin changes  Abdomen: soft, non tender, no palpable masses  Pelvic exam: normal atrophic external genitalia, urethral meatus normal, vagina atrophic without lesions, cervix atrophic without lesions, uterus small, non tender, no adnexal masses, non tender, palpable bowel loops as in past  Rectal exam: deferred secondary to recent normal u/s    "

## 2024-11-25 ENCOUNTER — ANNUAL EXAM (OUTPATIENT)
Dept: OBGYN CLINIC | Facility: CLINIC | Age: 54
End: 2024-11-25
Payer: COMMERCIAL

## 2024-11-25 VITALS
SYSTOLIC BLOOD PRESSURE: 118 MMHG | WEIGHT: 116.4 LBS | HEIGHT: 61 IN | DIASTOLIC BLOOD PRESSURE: 72 MMHG | BODY MASS INDEX: 21.98 KG/M2

## 2024-11-25 DIAGNOSIS — Z12.31 ENCOUNTER FOR SCREENING MAMMOGRAM FOR BREAST CANCER: ICD-10-CM

## 2024-11-25 DIAGNOSIS — R92.343 EXTREMELY DENSE TISSUE OF BOTH BREASTS ON MAMMOGRAPHY: ICD-10-CM

## 2024-11-25 DIAGNOSIS — Z01.419 ENCOUNTER FOR GYNECOLOGICAL EXAMINATION (GENERAL) (ROUTINE) WITHOUT ABNORMAL FINDINGS: Primary | ICD-10-CM

## 2024-11-25 PROCEDURE — 99396 PREV VISIT EST AGE 40-64: CPT | Performed by: OBSTETRICS & GYNECOLOGY

## 2024-11-25 NOTE — ASSESSMENT & PLAN NOTE
Here for well check, no breast or gyn complaints  Reviewed normal pelvic u/s from 11/6/24 - AV uterus 7.2 cm with 2.1 cm posterior fibroid.EMS 5 mm, R ov 1.7 and L ov 2.0 cm WNL.  Normal breast and pelvic exams.  Last pap 8/2021 neg/HPV neg; due by 2026  Mammo order given, last 11/9/23 BIRADS 1D; declined u/s in past, agrees this year. Order placed.  Colonoscopy 2018, due 2028  Dexa 2022; fem neck T-1.9, low risk of fracture; calcium recs given; repeat 3-5 years

## 2024-11-25 NOTE — LETTER
2024     Denise Smith, DO  [address] [facility]  [address]  [facility] [address]    Patient: Patricia Medina   YOB: 1970   Date of Visit: 2024       Dear Dr. Smith:    Patricia Medina was in today for her annual gyn exam. Below are my notes for this visit.    If you have questions, please do not hesitate to call me. I look forward to following your patient along with you.         Sincerely,        Miladis Ruvalcaba MD        CC: No Recipients    Miladis Ruvalcaba MD  2024  1:56 PM  Sign when Signing Visit  Assessment/Plan:    Encounter for gynecological examination (general) (routine) without abnormal findings  Here for well check, no breast or gyn complaints  Reviewed normal pelvic u/s from 24 - AV uterus 7.2 cm with 2.1 cm posterior fibroid.EMS 5 mm, R ov 1.7 and L ov 2.0 cm WNL.  Normal breast and pelvic exams.  Last pap 2021 neg/HPV neg; due by   Mammo order given, last 23 BIRADS 1D; declined u/s in past, agrees this year. Order placed.  Colonoscopy , due   Dexa ; fem neck T-1.9, low risk of fracture; calcium recs given; repeat 3-5 years       Diagnoses and all orders for this visit:    Encounter for screening mammogram for breast cancer  -     Mammo screening bilateral w 3d and cad; Future    Encounter for gynecological examination (general) (routine) without abnormal findings    Extremely dense tissue of both breasts on mammography  -     US breast screening bilateral complete (ABUS); Future          Subjective:      Patient ID: Patricia Medina is a 54 y.o. female.    HPI Here for well check.    The following portions of the patient's history were reviewed and updated as appropriate: She  has a past medical history of COVID,  2 para 2, History of pelvic ultrasound (2020), and Papanicolaou smear (2017).  She   Patient Active Problem List    Diagnosis Date Noted   • Anxiety 2024   • Dense breast tissue on mammogram 2021     She  has  "a past surgical history that includes [place] tooth extraction; Mammo (historical) (Bilateral, 06/17/2020); Colonoscopy (11/2018); Foot surgery (Left, 2023); and pr hysteroscopy bx endometrium&/polypc w/wo d&c (N/A, 3/28/2024).  Her family history is not on file.  She  reports that she has never smoked. She has never been exposed to tobacco smoke. She has never used smokeless tobacco. She reports current alcohol use. She reports that she does not use drugs.  Current Outpatient Medications   Medication Sig Dispense Refill   • ALPRAZolam (XANAX) 0.25 mg tablet as needed      • Calcium Carb-Cholecalciferol (CALCIUM 1000 + D PO) Take 1 tablet by mouth 2 (two) times a day     • escitalopram (LEXAPRO) 10 mg tablet Take 15 mg by mouth daily     • loratadine (CLARITIN) 10 mg tablet Take 10 mg by mouth daily     • magnesium 30 MG tablet Take 30 mg by mouth every evening     • NON FORMULARY daily OTC thyroid supplement       No current facility-administered medications for this visit.     She has no known allergies..    Review of Systems  No PMB, breast, bladder, bowel changes. No new persistent pain, bloating, early satiety or pelvic pressure    Objective:    /72 (BP Location: Left arm, Patient Position: Sitting, Cuff Size: Standard)   Ht 5' 1\" (1.549 m)   Wt 52.8 kg (116 lb 6.4 oz)   LMP 07/27/2021 (Exact Date)   BMI 21.99 kg/m²      Physical Exam    General appearance: no distress, pleasant  Neck: thyroid without nodules or thyromegaly, no palpable adenopathy  Lymph nodes: no palpable adenopathy  Breasts: no masses, nodes or skin changes  Abdomen: soft, non tender, no palpable masses  Pelvic exam: normal atrophic external genitalia, urethral meatus normal, vagina atrophic without lesions, cervix atrophic without lesions, uterus small, non tender, no adnexal masses, non tender, palpable bowel loops as in past  Rectal exam: deferred secondary to recent normal u/s      "

## 2024-11-25 NOTE — PATIENT INSTRUCTIONS
Return to office in one year unless having any problems such as breast changes, bleeding, new persistent pain, new progressive bloating, new problems eating (getting full to quickly) or new constant urinary pressure that does not resolve in one week.    Continue to strive for 1200 mg of calcium and 1000 IU of vitamin D daily in diet and supplements combined for your bone health.  You can only absorb 600 mg of calcium at a time. Avoid excess calcium as this may adversely effect your heart.  There are 400 mg of calcium in an 8 oz serving of dairy.  Gabriels milk has 600 mg of calcium in an 8 oz serving.

## 2025-02-12 DIAGNOSIS — Z12.31 ENCOUNTER FOR SCREENING MAMMOGRAM FOR BREAST CANCER: ICD-10-CM

## 2025-02-13 ENCOUNTER — RESULTS FOLLOW-UP (OUTPATIENT)
Dept: OBGYN CLINIC | Facility: CLINIC | Age: 55
End: 2025-02-13

## 2025-02-19 ENCOUNTER — RESULTS FOLLOW-UP (OUTPATIENT)
Dept: OBGYN CLINIC | Facility: CLINIC | Age: 55
End: 2025-02-19

## 2025-04-19 NOTE — PROGRESS NOTES
Name: Patricia Medina      : 1970      MRN: [id number]  Encounter Provider: Miladis Ruvalcaba MD  Encounter Date: 2025   Encounter department: [facility] [place]  :  Assessment & Plan  Menopausal symptoms  Here to discuss menopause symptoms of hot flashes, night sweats, sleep disturbances, weight gain, cognitive changes, mood alteration, decreased libido. Interested in HRT.   LMP 2021    Reviewed behavioral modification, avoidance of exaccerbating agents (alcohol, red wine, hot liquids), natural herbs (black cohash) and hormone replacement therapy reviewed. Aware of risks of estrogen including bleeding,  increased risk of clots in legs and lungs and cardiovascular events if started late in menopause. Questions answered.  Aware of limited effect on weight, libido, ADHD symptoms.   Vivelle 0.05 mg patch with 100 mg progesterone daily rx sent.      Orders:    estradiol (Vivelle-Dot) 0.05 MG/24HR; Place 1 patch on the skin 2 (two) times a week    Progesterone 100 MG CAPS; Take 1 capsule by mouth in the morning        History of Present Illness   HPI  Patricia Medina is a 55 y.o. female who presents for discussion of menopause.   Last mammo normal 2025  No h/o breast cancer or thrombosis.      Review of Systems see above    Past Medical History   Past Medical History:   Diagnosis Date    COVID     Dec 2023    History of pelvic ultrasound 2020    Normal     Past Surgical History:   Procedure Laterality Date    COLONOSCOPY  2018    [facility]    FOOT SURGERY Left     ME HYSTEROSCOPY BX ENDOMETRIUM&/POLYPC W/WO D&C N/A 2024    Procedure: DILATATION AND CURETTAGE (D&C) WITH HYSTEROSCOPY, POLYPECTOMY;  Surgeon: Miladis Ruvalcaba MD;  Location:  MAIN OR;  Service: Gynecology    WISDOM TOOTH EXTRACTION       Family History   Problem Relation Age of Onset    Breast cancer Neg Hx     Uterine cancer Neg Hx     Ovarian cancer Neg Hx     Colon cancer Neg Hx       reports that she  "has never smoked. She has never been exposed to tobacco smoke. She has never used smokeless tobacco. She reports current alcohol use. She reports that she does not use drugs.  Current Outpatient Medications   Medication Instructions    ALPRAZolam (XANAX) 0.25 mg tablet As needed    Calcium Carb-Cholecalciferol (CALCIUM 1000 + D PO) 1 tablet, 2 times daily    escitalopram (LEXAPRO) 15 mg, Daily    [START ON 4/24/2025] estradiol (Vivelle-Dot) 0.05 MG/24HR 1 patch, Transdermal, 2 times weekly    loratadine (CLARITIN) 10 mg, Daily    magnesium 30 mg, Every evening    NON FORMULARY Daily    Progesterone 100 MG CAPS 1 capsule, Oral, Daily   No Known Allergies      Objective   /68 (BP Location: Left arm, Patient Position: Sitting, Cuff Size: Standard)   Ht 5' 1\" (1.549 m)   Wt 54.6 kg (120 lb 6.4 oz)   LMP 07/27/2021 (Exact Date)   BMI 22.75 kg/m²      Physical Exam  Appears well, no apparent distress.   Does not appear anxious or depressed.        "

## 2025-04-22 ENCOUNTER — OFFICE VISIT (OUTPATIENT)
Dept: OBGYN CLINIC | Facility: CLINIC | Age: 55
End: 2025-04-22
Payer: COMMERCIAL

## 2025-04-22 VITALS
BODY MASS INDEX: 22.73 KG/M2 | HEIGHT: 61 IN | WEIGHT: 120.4 LBS | SYSTOLIC BLOOD PRESSURE: 120 MMHG | DIASTOLIC BLOOD PRESSURE: 68 MMHG

## 2025-04-22 DIAGNOSIS — N95.1 MENOPAUSAL SYMPTOMS: Primary | ICD-10-CM

## 2025-04-22 PROCEDURE — 99213 OFFICE O/P EST LOW 20 MIN: CPT | Performed by: OBSTETRICS & GYNECOLOGY

## 2025-04-22 RX ORDER — ESTRADIOL 0.05 MG/D
1 PATCH, EXTENDED RELEASE TRANSDERMAL 2 TIMES WEEKLY
Qty: 24 PATCH | Refills: 1 | Status: SHIPPED | OUTPATIENT
Start: 2025-04-24

## 2025-04-22 RX ORDER — PROGESTERONE 100 MG/1
1 CAPSULE ORAL DAILY
Qty: 90 CAPSULE | Refills: 1 | Status: SHIPPED | OUTPATIENT
Start: 2025-04-22

## 2025-04-22 NOTE — LETTER
2025     Denise Smith DO  [address] [facility] [address]  [facility] [address]    Patient: Patricia Medina   YOB: 1970   Date of Visit: 2025       Dear Dr. Denise Smith DO:    Patricia Medina was in to see me today for evaluation. Below are my notes for this visit.    If you have questions, please do not hesitate to call me. I look forward to following your patient along with you.         Sincerely,        Miladis Ruvalcaba MD        CC: No Recipients    Miladis Ruvalcaba MD  2025  9:34 AM  Sign when Signing Visit  Name: Patricia Medina      : 1970      MRN: [id number]  Encounter Provider: Miladis Ruvalcaba MD  Encounter Date: 2025   Encounter department: [facility] [place]  :  Assessment & Plan  Menopausal symptoms  Here to discuss menopause symptoms of hot flashes, night sweats, sleep disturbances, weight gain, cognitive changes, mood alteration, decreased libido. Interested in HRT.   LMP 2021    Reviewed behavioral modification, avoidance of exaccerbating agents (alcohol, red wine, hot liquids), natural herbs (black cohash) and hormone replacement therapy reviewed. Aware of risks of estrogen including bleeding,  increased risk of clots in legs and lungs and cardiovascular events if started late in menopause. Questions answered.  Aware of limited effect on weight, libido, ADHD symptoms.   Vivelle 0.05 mg patch with 100 mg progesterone daily rx sent.      Orders:  •  estradiol (Vivelle-Dot) 0.05 MG/24HR; Place 1 patch on the skin 2 (two) times a week  •  Progesterone 100 MG CAPS; Take 1 capsule by mouth in the morning        History of Present Illness  HPI  Patricia Medina is a 55 y.o. female who presents for discussion of menopause.   Last mammo normal 2025  No h/o breast cancer or thrombosis.      Review of Systems see above    Past Medical History  Past Medical History:   Diagnosis Date   • COVID     Dec 2023   • History of pelvic ultrasound 2020     "Normal     Past Surgical History:   Procedure Laterality Date   • COLONOSCOPY  11/2018    [facility]   • FOOT SURGERY Left 2023   • MA HYSTEROSCOPY BX ENDOMETRIUM&/POLYPC W/WO D&C N/A 03/28/2024    Procedure: DILATATION AND CURETTAGE (D&C) WITH HYSTEROSCOPY, POLYPECTOMY;  Surgeon: Miladis Ruvalcaba MD;  Location:  MAIN OR;  Service: Gynecology   • WISDOM TOOTH EXTRACTION       Family History   Problem Relation Age of Onset   • Breast cancer Neg Hx    • Uterine cancer Neg Hx    • Ovarian cancer Neg Hx    • Colon cancer Neg Hx       reports that she has never smoked. She has never been exposed to tobacco smoke. She has never used smokeless tobacco. She reports current alcohol use. She reports that she does not use drugs.  Current Outpatient Medications   Medication Instructions   • ALPRAZolam (XANAX) 0.25 mg tablet As needed   • Calcium Carb-Cholecalciferol (CALCIUM 1000 + D PO) 1 tablet, 2 times daily   • escitalopram (LEXAPRO) 15 mg, Daily   • [START ON 4/24/2025] estradiol (Vivelle-Dot) 0.05 MG/24HR 1 patch, Transdermal, 2 times weekly   • loratadine (CLARITIN) 10 mg, Daily   • magnesium 30 mg, Every evening   • NON FORMULARY Daily   • Progesterone 100 MG CAPS 1 capsule, Oral, Daily   No Known Allergies      Objective  /68 (BP Location: Left arm, Patient Position: Sitting, Cuff Size: Standard)   Ht 5' 1\" (1.549 m)   Wt 54.6 kg (120 lb 6.4 oz)   LMP 07/27/2021 (Exact Date)   BMI 22.75 kg/m²      Physical Exam  Appears well, no apparent distress.   Does not appear anxious or depressed.        "

## 2025-05-23 DIAGNOSIS — N95.1 MENOPAUSAL SYMPTOMS: ICD-10-CM

## 2025-05-23 RX ORDER — ESTRADIOL 0.05 MG/D
1 PATCH, EXTENDED RELEASE TRANSDERMAL 2 TIMES WEEKLY
Qty: 24 PATCH | Refills: 1 | Status: SHIPPED | OUTPATIENT
Start: 2025-05-26

## 2025-05-23 NOTE — TELEPHONE ENCOUNTER
NOT A DUPLICATE  Patient now needs 90 day supply to mail order per ins    Reason for call:   [] Refill   [] Prior Auth  [] Other:     Office:   [] PCP/Provider -   [x] Specialty/Provider - Jordon seo    Medication:   Estradiol 0.05mg, 1 patch 2 x weekly, 24    Pharmacy:   [facility]    Local Pharmacy   Does the patient have enough for 3 days?   [] Yes   [] No - Send as HP to POD    Mail Away Pharmacy   Does the patient have enough for 10 days?   [x] Yes   [] No - Send as HP to POD

## 2025-07-15 ENCOUNTER — OFFICE VISIT (OUTPATIENT)
Dept: OBGYN CLINIC | Facility: CLINIC | Age: 55
End: 2025-07-15
Payer: COMMERCIAL

## 2025-07-15 VITALS
DIASTOLIC BLOOD PRESSURE: 56 MMHG | SYSTOLIC BLOOD PRESSURE: 94 MMHG | BODY MASS INDEX: 22.77 KG/M2 | WEIGHT: 120.6 LBS | HEIGHT: 61 IN

## 2025-07-15 DIAGNOSIS — N95.1 MENOPAUSAL SYMPTOMS: Primary | ICD-10-CM

## 2025-07-15 PROCEDURE — 99213 OFFICE O/P EST LOW 20 MIN: CPT | Performed by: OBSTETRICS & GYNECOLOGY

## 2025-07-15 RX ORDER — CHOLECALCIFEROL (VITAMIN D3) 10 MCG/2ML
DROPS ORAL
COMMUNITY

## 2025-07-15 RX ORDER — ESTRADIOL 0.1 MG/G
1 CREAM VAGINAL 2 TIMES WEEKLY
Qty: 42.5 G | Refills: 1 | Status: SHIPPED | OUTPATIENT
Start: 2025-07-17

## 2025-07-15 RX ORDER — PROGESTERONE 100 MG/1
1 CAPSULE ORAL DAILY
Qty: 90 CAPSULE | Refills: 3 | Status: SHIPPED | OUTPATIENT
Start: 2025-07-15

## 2025-07-15 RX ORDER — ESTRADIOL 0.05 MG/D
1 PATCH, EXTENDED RELEASE TRANSDERMAL 2 TIMES WEEKLY
Qty: 24 PATCH | Refills: 3 | Status: SHIPPED | OUTPATIENT
Start: 2025-07-17

## 2025-08-07 ENCOUNTER — VBI (OUTPATIENT)
Dept: ADMINISTRATIVE | Facility: OTHER | Age: 55
End: 2025-08-07

## 2025-08-21 ENCOUNTER — OFFICE VISIT (OUTPATIENT)
Age: 55
End: 2025-08-21
Payer: COMMERCIAL

## 2025-08-21 VITALS
DIASTOLIC BLOOD PRESSURE: 64 MMHG | HEART RATE: 64 BPM | OXYGEN SATURATION: 98 % | WEIGHT: 121.6 LBS | SYSTOLIC BLOOD PRESSURE: 110 MMHG | TEMPERATURE: 97.6 F | BODY MASS INDEX: 22.96 KG/M2 | HEIGHT: 61 IN

## 2025-08-21 DIAGNOSIS — Z13.1 SCREENING FOR DIABETES MELLITUS: ICD-10-CM

## 2025-08-21 DIAGNOSIS — R53.83 FATIGUE, UNSPECIFIED TYPE: ICD-10-CM

## 2025-08-21 DIAGNOSIS — Z00.00 ANNUAL PHYSICAL EXAM: Primary | ICD-10-CM

## 2025-08-21 DIAGNOSIS — Z23 ENCOUNTER FOR IMMUNIZATION: ICD-10-CM

## 2025-08-21 DIAGNOSIS — Z92.29 HISTORY OF POSTMENOPAUSAL HRT: ICD-10-CM

## 2025-08-21 DIAGNOSIS — R92.30 DENSE BREAST TISSUE ON MAMMOGRAM, UNSPECIFIED TYPE: ICD-10-CM

## 2025-08-21 DIAGNOSIS — Z13.6 SCREENING FOR CARDIOVASCULAR CONDITION: ICD-10-CM

## 2025-08-21 DIAGNOSIS — F41.1 GENERALIZED ANXIETY DISORDER: ICD-10-CM

## 2025-08-21 DIAGNOSIS — F41.9 ANXIETY: ICD-10-CM

## 2025-08-21 PROBLEM — M20.20 ACQUIRED HALLUX RIGIDUS: Status: RESOLVED | Noted: 2025-08-21 | Resolved: 2025-08-21

## 2025-08-21 PROBLEM — M21.612 BUNION OF LEFT FOOT: Status: RESOLVED | Noted: 2025-08-21 | Resolved: 2025-08-21

## 2025-08-21 PROBLEM — F33.1 MAJOR DEPRESSIVE DISORDER, RECURRENT, MODERATE (HCC): Status: ACTIVE | Noted: 2025-08-21

## 2025-08-21 PROCEDURE — 99396 PREV VISIT EST AGE 40-64: CPT | Performed by: INTERNAL MEDICINE

## 2025-08-21 PROCEDURE — 90471 IMMUNIZATION ADMIN: CPT

## 2025-08-21 PROCEDURE — 90677 PCV20 VACCINE IM: CPT

## 2025-08-21 RX ORDER — ALPRAZOLAM 0.25 MG
0.25 TABLET ORAL
Qty: 30 TABLET | Refills: 1 | Status: SHIPPED | OUTPATIENT
Start: 2025-08-21

## (undated) DEVICE — SOLN IRRIG .9%SOD 1000ML

## (undated) DEVICE — SUTURE BIOSYN 4-0 UNDYED 1X18 P-12

## (undated) DEVICE — LIGHT GLOVE GREEN

## (undated) DEVICE — PREMIUM DRY TRAY LF: Brand: MEDLINE INDUSTRIES, INC.

## (undated) DEVICE — SYRINGE DISP LUER-LOK  5 CC

## (undated) DEVICE — BLANKET UPPER BODY

## (undated) DEVICE — SYRINGE DISP LUER-LOK 10 CC

## (undated) DEVICE — SYRINGE 10ML LL CONTROL TOP

## (undated) DEVICE — ARTHROSCOPY FLOOR MAT

## (undated) DEVICE — 1820 FOAM BLOCK NEEDLE COUNTER: Brand: DEVON

## (undated) DEVICE — FIBERWIRE 2-0 18IN W/TAPERED NEEDLE

## (undated) DEVICE — WRAP COBAN LATEX FREE 4IN STERILE

## (undated) DEVICE — DRAPE MINI C-ARM OEC FLUOROSCAN

## (undated) DEVICE — BANDAGE KERLIX STERILE 4.5INX 4.1YDS

## (undated) DEVICE — NEEDLE DISP HYPO 25GX1-1/2IN

## (undated) DEVICE — BANDAGE CONFORM 3IN STERILE

## (undated) DEVICE — TISSUE REMOVAL SYSTEM RESECTING DEVICE: Brand: SYMPHION

## (undated) DEVICE — STERISTRIP 1/2INX4IN

## (undated) DEVICE — GLOVE SRG LF STRL BGL SKNSNS 7.5 PF

## (undated) DEVICE — CHLORHEXIDINE 4PCT 4 OZ

## (undated) DEVICE — MAXI PAD5.51 X 13.78 IN. (14.0 X 35.0 CM)HEAVYCONTOUREDUNSCENTED: Brand: CURITY

## (undated) DEVICE — STRL ALLENTOWN HYSTEROSCOPY PK: Brand: CARDINAL HEALTH

## (undated) DEVICE — TUBING SMOKE EVAC PENCIL COATED

## (undated) DEVICE — TOWEL SURGICAL W17XL27IN BLUE COTTON STANDARD PREWASHED DELI

## (undated) DEVICE — NEEDLE SPINAL 22G X 3.5IN  QUINCKE

## (undated) DEVICE — CUFF TOURNIQUET DISP 18 X 4

## (undated) DEVICE — SUTURE POLYSORB 2-0 UNDYED 1X30 GS-10

## (undated) DEVICE — SOLN IRRIG STER WATER 1000ML

## (undated) DEVICE — SHEET DRAPE 3/4 REVERSEFOLD 53X77

## (undated) DEVICE — BLADE LONG MEDIUM 25.0MMX9.0MM

## (undated) DEVICE — APPLICATOR CHLORAPREP 10.5ML ORANGE TINT

## (undated) DEVICE — DRESSING SPONGE ALL GAUZE 4X4 STER 10PK

## (undated) DEVICE — PAD GROUND ELECTROSURGICAL W/CORD

## (undated) DEVICE — PACK RFID ORTHO MINOR

## (undated) DEVICE — GLOVE SZ 7.5 PROTEXIS CLASSIC LATEX

## (undated) DEVICE — TISSUE REMOVAL SYSTEM FLUID MANAGEMENT ACCESSORIES: Brand: SYMPHION

## (undated) DEVICE — GLOVE SZ 8 LINER PROTEXIS PI BL

## (undated) DEVICE — BLADE SCALPEL #15

## (undated) DEVICE — CAST PADDING 4IN

## (undated) DEVICE — GOWN SIRUS FABRNF RAGLAN XL ST 28/CS

## (undated) DEVICE — NEEDLE SAFE BLUNT 18G

## (undated) DEVICE — COVER LIGHTHANDLE (STERILE SINGLE PA

## (undated) DEVICE — APPLICATOR CHLORAPREP 26ML ORANGE TINT